# Patient Record
Sex: FEMALE | Race: WHITE | Employment: OTHER | URBAN - METROPOLITAN AREA
[De-identification: names, ages, dates, MRNs, and addresses within clinical notes are randomized per-mention and may not be internally consistent; named-entity substitution may affect disease eponyms.]

---

## 2019-06-12 ENCOUNTER — APPOINTMENT (OUTPATIENT)
Dept: CT IMAGING | Age: 74
DRG: 682 | End: 2019-06-12
Payer: MEDICARE

## 2019-06-12 ENCOUNTER — APPOINTMENT (OUTPATIENT)
Dept: GENERAL RADIOLOGY | Age: 74
DRG: 682 | End: 2019-06-12
Payer: MEDICARE

## 2019-06-12 ENCOUNTER — HOSPITAL ENCOUNTER (INPATIENT)
Age: 74
LOS: 3 days | Discharge: HOME | DRG: 682 | End: 2019-06-15
Attending: EMERGENCY MEDICINE | Admitting: INTERNAL MEDICINE
Payer: MEDICARE

## 2019-06-12 DIAGNOSIS — R42 DIZZINESS: Primary | ICD-10-CM

## 2019-06-12 PROBLEM — N17.9 AKI (ACUTE KIDNEY INJURY) (HCC): Status: ACTIVE | Noted: 2019-06-12

## 2019-06-12 LAB
A/G RATIO: 0.8 (ref 1.1–2.2)
ALBUMIN SERPL-MCNC: 3.3 G/DL (ref 3.4–5)
ALP BLD-CCNC: 52 U/L (ref 40–129)
ALT SERPL-CCNC: 26 U/L (ref 10–40)
ANION GAP SERPL CALCULATED.3IONS-SCNC: 14 MMOL/L (ref 3–16)
AST SERPL-CCNC: 49 U/L (ref 15–37)
BACTERIA: ABNORMAL /HPF
BASOPHILS ABSOLUTE: 0 K/UL (ref 0–0.2)
BASOPHILS RELATIVE PERCENT: 0.2 %
BILIRUB SERPL-MCNC: 1.6 MG/DL (ref 0–1)
BILIRUBIN URINE: NEGATIVE
BLOOD, URINE: ABNORMAL
BUN BLDV-MCNC: 38 MG/DL (ref 7–20)
CALCIUM SERPL-MCNC: 9.9 MG/DL (ref 8.3–10.6)
CHLORIDE BLD-SCNC: 99 MMOL/L (ref 99–110)
CLARITY: ABNORMAL
CO2: 20 MMOL/L (ref 21–32)
COLOR: YELLOW
COMMENT UA: ABNORMAL
CREAT SERPL-MCNC: 1.9 MG/DL (ref 0.6–1.2)
EOSINOPHILS ABSOLUTE: 0 K/UL (ref 0–0.6)
EOSINOPHILS RELATIVE PERCENT: 0.5 %
GFR AFRICAN AMERICAN: 31
GFR NON-AFRICAN AMERICAN: 26
GLOBULIN: 4 G/DL
GLUCOSE BLD-MCNC: 116 MG/DL (ref 70–99)
GLUCOSE URINE: NEGATIVE MG/DL
HCT VFR BLD CALC: 30.4 % (ref 36–48)
HEMOGLOBIN: 10.8 G/DL (ref 12–16)
KETONES, URINE: NEGATIVE MG/DL
LEUKOCYTE ESTERASE, URINE: ABNORMAL
LYMPHOCYTES ABSOLUTE: 0.4 K/UL (ref 1–5.1)
LYMPHOCYTES RELATIVE PERCENT: 5.6 %
MCH RBC QN AUTO: 32.7 PG (ref 26–34)
MCHC RBC AUTO-ENTMCNC: 35.4 G/DL (ref 31–36)
MCV RBC AUTO: 92.3 FL (ref 80–100)
MICROSCOPIC EXAMINATION: YES
MONOCYTES ABSOLUTE: 0.5 K/UL (ref 0–1.3)
MONOCYTES RELATIVE PERCENT: 6.5 %
NEUTROPHILS ABSOLUTE: 6.5 K/UL (ref 1.7–7.7)
NEUTROPHILS RELATIVE PERCENT: 87.2 %
NITRITE, URINE: NEGATIVE
PDW BLD-RTO: 14.8 % (ref 12.4–15.4)
PH UA: 6 (ref 5–8)
PLATELET # BLD: 191 K/UL (ref 135–450)
PMV BLD AUTO: 8 FL (ref 5–10.5)
POTASSIUM SERPL-SCNC: 3.7 MMOL/L (ref 3.5–5.1)
PROTEIN UA: ABNORMAL MG/DL
RBC # BLD: 3.29 M/UL (ref 4–5.2)
RBC UA: ABNORMAL /HPF (ref 0–2)
SODIUM BLD-SCNC: 133 MMOL/L (ref 136–145)
SPECIFIC GRAVITY UA: 1.01 (ref 1–1.03)
TOTAL CK: 192 U/L (ref 26–192)
TOTAL PROTEIN: 7.3 G/DL (ref 6.4–8.2)
TROPONIN: <0.01 NG/ML
URINE REFLEX TO CULTURE: YES
URINE TYPE: ABNORMAL
UROBILINOGEN, URINE: 1 E.U./DL
WBC # BLD: 7.4 K/UL (ref 4–11)
WBC UA: ABNORMAL /HPF (ref 0–5)

## 2019-06-12 PROCEDURE — 72125 CT NECK SPINE W/O DYE: CPT

## 2019-06-12 PROCEDURE — 2580000003 HC RX 258: Performed by: INTERNAL MEDICINE

## 2019-06-12 PROCEDURE — 70450 CT HEAD/BRAIN W/O DYE: CPT

## 2019-06-12 PROCEDURE — 81001 URINALYSIS AUTO W/SCOPE: CPT

## 2019-06-12 PROCEDURE — 87186 SC STD MICRODIL/AGAR DIL: CPT

## 2019-06-12 PROCEDURE — 96360 HYDRATION IV INFUSION INIT: CPT

## 2019-06-12 PROCEDURE — 1200000000 HC SEMI PRIVATE

## 2019-06-12 PROCEDURE — 96361 HYDRATE IV INFUSION ADD-ON: CPT

## 2019-06-12 PROCEDURE — 2580000003 HC RX 258: Performed by: EMERGENCY MEDICINE

## 2019-06-12 PROCEDURE — 71045 X-RAY EXAM CHEST 1 VIEW: CPT

## 2019-06-12 PROCEDURE — 6370000000 HC RX 637 (ALT 250 FOR IP): Performed by: INTERNAL MEDICINE

## 2019-06-12 PROCEDURE — 87077 CULTURE AEROBIC IDENTIFY: CPT

## 2019-06-12 PROCEDURE — 82550 ASSAY OF CK (CPK): CPT

## 2019-06-12 PROCEDURE — 36415 COLL VENOUS BLD VENIPUNCTURE: CPT

## 2019-06-12 PROCEDURE — 99285 EMERGENCY DEPT VISIT HI MDM: CPT

## 2019-06-12 PROCEDURE — 87086 URINE CULTURE/COLONY COUNT: CPT

## 2019-06-12 PROCEDURE — 93005 ELECTROCARDIOGRAM TRACING: CPT | Performed by: EMERGENCY MEDICINE

## 2019-06-12 PROCEDURE — 6360000002 HC RX W HCPCS: Performed by: INTERNAL MEDICINE

## 2019-06-12 PROCEDURE — 84484 ASSAY OF TROPONIN QUANT: CPT

## 2019-06-12 PROCEDURE — 73521 X-RAY EXAM HIPS BI 2 VIEWS: CPT

## 2019-06-12 PROCEDURE — 80053 COMPREHEN METABOLIC PANEL: CPT

## 2019-06-12 PROCEDURE — 85025 COMPLETE CBC W/AUTO DIFF WBC: CPT

## 2019-06-12 RX ORDER — ACETAMINOPHEN 325 MG/1
325 TABLET ORAL ONCE
Status: DISCONTINUED | OUTPATIENT
Start: 2019-06-12 | End: 2019-06-12

## 2019-06-12 RX ORDER — ACETAMINOPHEN 325 MG/1
650 TABLET ORAL EVERY 4 HOURS PRN
Status: DISCONTINUED | OUTPATIENT
Start: 2019-06-12 | End: 2019-06-15 | Stop reason: HOSPADM

## 2019-06-12 RX ORDER — ACETAMINOPHEN 325 MG/1
650 TABLET ORAL ONCE
Status: COMPLETED | OUTPATIENT
Start: 2019-06-12 | End: 2019-06-12

## 2019-06-12 RX ORDER — ACETAMINOPHEN, ASPIRIN AND CAFFEINE 250; 250; 65 MG/1; MG/1; MG/1
1 TABLET, FILM COATED ORAL EVERY 6 HOURS PRN
Status: DISCONTINUED | OUTPATIENT
Start: 2019-06-12 | End: 2019-06-15 | Stop reason: HOSPADM

## 2019-06-12 RX ORDER — SODIUM CHLORIDE 9 MG/ML
INJECTION, SOLUTION INTRAVENOUS CONTINUOUS
Status: DISCONTINUED | OUTPATIENT
Start: 2019-06-12 | End: 2019-06-14

## 2019-06-12 RX ORDER — KETOROLAC TROMETHAMINE 30 MG/ML
15 INJECTION, SOLUTION INTRAMUSCULAR; INTRAVENOUS EVERY 6 HOURS PRN
Status: CANCELLED | OUTPATIENT
Start: 2019-06-12 | End: 2019-06-17

## 2019-06-12 RX ORDER — SODIUM CHLORIDE 0.9 % (FLUSH) 0.9 %
10 SYRINGE (ML) INJECTION EVERY 12 HOURS SCHEDULED
Status: DISCONTINUED | OUTPATIENT
Start: 2019-06-12 | End: 2019-06-15 | Stop reason: HOSPADM

## 2019-06-12 RX ORDER — VITAMIN B COMPLEX
1 CAPSULE ORAL DAILY
COMMUNITY

## 2019-06-12 RX ORDER — ASCORBIC ACID 500 MG
500 TABLET ORAL DAILY
COMMUNITY

## 2019-06-12 RX ORDER — 0.9 % SODIUM CHLORIDE 0.9 %
500 INTRAVENOUS SOLUTION INTRAVENOUS ONCE
Status: COMPLETED | OUTPATIENT
Start: 2019-06-12 | End: 2019-06-12

## 2019-06-12 RX ORDER — M-VIT,TX,IRON,MINS/CALC/FOLIC 27MG-0.4MG
1 TABLET ORAL DAILY
COMMUNITY

## 2019-06-12 RX ORDER — ONDANSETRON 2 MG/ML
4 INJECTION INTRAMUSCULAR; INTRAVENOUS EVERY 6 HOURS PRN
Status: DISCONTINUED | OUTPATIENT
Start: 2019-06-12 | End: 2019-06-15 | Stop reason: HOSPADM

## 2019-06-12 RX ORDER — SODIUM CHLORIDE 0.9 % (FLUSH) 0.9 %
10 SYRINGE (ML) INJECTION PRN
Status: DISCONTINUED | OUTPATIENT
Start: 2019-06-12 | End: 2019-06-15 | Stop reason: HOSPADM

## 2019-06-12 RX ORDER — ACETAMINOPHEN, ASPIRIN AND CAFFEINE 250; 250; 65 MG/1; MG/1; MG/1
1 TABLET, FILM COATED ORAL EVERY 6 HOURS PRN
COMMUNITY
End: 2022-09-06

## 2019-06-12 RX ADMIN — ACETAMINOPHEN 650 MG: 325 TABLET ORAL at 18:21

## 2019-06-12 RX ADMIN — SODIUM CHLORIDE: 9 INJECTION, SOLUTION INTRAVENOUS at 21:45

## 2019-06-12 RX ADMIN — Medication 10 ML: at 21:45

## 2019-06-12 RX ADMIN — ENOXAPARIN SODIUM 30 MG: 30 INJECTION SUBCUTANEOUS at 21:45

## 2019-06-12 RX ADMIN — SODIUM CHLORIDE 500 ML: 9 INJECTION, SOLUTION INTRAVENOUS at 16:53

## 2019-06-12 SDOH — HEALTH STABILITY: MENTAL HEALTH: HOW OFTEN DO YOU HAVE A DRINK CONTAINING ALCOHOL?: NEVER

## 2019-06-12 ASSESSMENT — PAIN SCALES - GENERAL
PAINLEVEL_OUTOF10: 6
PAINLEVEL_OUTOF10: 6

## 2019-06-12 ASSESSMENT — PAIN DESCRIPTION - DESCRIPTORS: DESCRIPTORS: ACHING

## 2019-06-12 ASSESSMENT — PAIN DESCRIPTION - PAIN TYPE: TYPE: ACUTE PAIN

## 2019-06-12 ASSESSMENT — PAIN DESCRIPTION - LOCATION: LOCATION: GENERALIZED

## 2019-06-12 NOTE — H&P
Yes Historical Provider, MD       Allergies:  Barium iodide and Penicillins    Social History:  The patient currently lives at home and cares for herself    TOBACCO:   reports that she has never smoked. She has never used smokeless tobacco.  ETOH:   reports that she does not drink alcohol. Family History:  Reviewed in detail and negative for DM, Early CAD, Cancer, CVA. Positive as follows:    History reviewed. No pertinent family history. REVIEW OF SYSTEMS:       See above    PHYSICAL EXAM:    BP (!) 143/66   Pulse 87   Temp 97.2 °F (36.2 °C) (Oral)   Resp 16   SpO2 100%     Gen: NAD  HEENT: NC/AT, moist mucous membranes, no oropharyngeal erythema or exudate  Neck: supple, trachea midline, no anterior cervical or SC LAD  Heart:  Normal s1/s2, RRR, no murmurs, gallops, or rubs. no leg edema  Lungs:  CTA bilaterally, no wheeze,no rales or rhonchi, no use of accessory muscles  Abd: bowel sounds present, soft, nontender, nondistended, no masses  Extrem:  No clubbing, cyanosis,  no edema  Skin: no lesion or masses  Psych:  A & O x3  Neuro: grossly intact, moves all four extremities      CXR:  I have reviewed the CXR with the following interpretation: Reviewed, no acute process  EKG:  I have reviewed the EKG with the following interpretation: Reviewed, no dynamic changes    CBC   Recent Labs     06/12/19  1547   WBC 7.4   HGB 10.8*   HCT 30.4*         RENAL  Recent Labs     06/12/19  1547   *   K 3.7   CL 99   CO2 20*   BUN 38*   CREATININE 1.9*     LFT'S  Recent Labs     06/12/19  1547   AST 49*   ALT 26   BILITOT 1.6*   ALKPHOS 52     COAG  No results for input(s): INR in the last 72 hours.   CARDIAC ENZYMES  Recent Labs     06/12/19  1547   CKTOTAL 192   TROPONINI <0.01       U/A:    Lab Results   Component Value Date    COLORU YELLOW 06/12/2019    WBCUA 3-5 06/12/2019    RBCUA 0-2 06/12/2019    BACTERIA Rare 06/12/2019    CLARITYU CLOUDY 06/12/2019    SPECGRAV 1.013 06/12/2019    LEUKOCYTESUR

## 2019-06-13 ENCOUNTER — APPOINTMENT (OUTPATIENT)
Dept: MRI IMAGING | Age: 74
DRG: 682 | End: 2019-06-13
Payer: MEDICARE

## 2019-06-13 LAB
ANION GAP SERPL CALCULATED.3IONS-SCNC: 11 MMOL/L (ref 3–16)
BUN BLDV-MCNC: 27 MG/DL (ref 7–20)
CALCIUM SERPL-MCNC: 9 MG/DL (ref 8.3–10.6)
CHLORIDE BLD-SCNC: 104 MMOL/L (ref 99–110)
CO2: 20 MMOL/L (ref 21–32)
CREAT SERPL-MCNC: 1.8 MG/DL (ref 0.6–1.2)
CREATININE URINE: 136.2 MG/DL (ref 28–259)
EKG ATRIAL RATE: 85 BPM
EKG DIAGNOSIS: NORMAL
EKG P AXIS: 55 DEGREES
EKG P-R INTERVAL: 148 MS
EKG Q-T INTERVAL: 406 MS
EKG QRS DURATION: 118 MS
EKG QTC CALCULATION (BAZETT): 483 MS
EKG R AXIS: -20 DEGREES
EKG T AXIS: 23 DEGREES
EKG VENTRICULAR RATE: 85 BPM
ESTIMATED AVERAGE GLUCOSE: 108.3 MG/DL
GFR AFRICAN AMERICAN: 33
GFR NON-AFRICAN AMERICAN: 28
GLUCOSE BLD-MCNC: 108 MG/DL (ref 70–99)
HBA1C MFR BLD: 5.4 %
HCT VFR BLD CALC: 27.8 % (ref 36–48)
HEMOGLOBIN: 9.7 G/DL (ref 12–16)
MCH RBC QN AUTO: 32 PG (ref 26–34)
MCHC RBC AUTO-ENTMCNC: 34.9 G/DL (ref 31–36)
MCV RBC AUTO: 91.6 FL (ref 80–100)
PDW BLD-RTO: 14.9 % (ref 12.4–15.4)
PLATELET # BLD: 217 K/UL (ref 135–450)
PMV BLD AUTO: 7.9 FL (ref 5–10.5)
POTASSIUM REFLEX MAGNESIUM: 3.7 MMOL/L (ref 3.5–5.1)
RBC # BLD: 3.03 M/UL (ref 4–5.2)
SODIUM BLD-SCNC: 135 MMOL/L (ref 136–145)
SODIUM URINE: 47 MMOL/L
T4 FREE: 0.4 NG/DL (ref 0.9–1.8)
TSH REFLEX: 27.06 UIU/ML (ref 0.27–4.2)
URIC ACID, UR: 65.5 MG/DL (ref 37–92)
WBC # BLD: 6.7 K/UL (ref 4–11)

## 2019-06-13 PROCEDURE — 97167 OT EVAL HIGH COMPLEX 60 MIN: CPT

## 2019-06-13 PROCEDURE — 36415 COLL VENOUS BLD VENIPUNCTURE: CPT

## 2019-06-13 PROCEDURE — 2580000003 HC RX 258: Performed by: INTERNAL MEDICINE

## 2019-06-13 PROCEDURE — 6370000000 HC RX 637 (ALT 250 FOR IP): Performed by: INTERNAL MEDICINE

## 2019-06-13 PROCEDURE — 1200000000 HC SEMI PRIVATE

## 2019-06-13 PROCEDURE — 84439 ASSAY OF FREE THYROXINE: CPT

## 2019-06-13 PROCEDURE — 70551 MRI BRAIN STEM W/O DYE: CPT

## 2019-06-13 PROCEDURE — 94760 N-INVAS EAR/PLS OXIMETRY 1: CPT

## 2019-06-13 PROCEDURE — 97530 THERAPEUTIC ACTIVITIES: CPT

## 2019-06-13 PROCEDURE — 6360000002 HC RX W HCPCS: Performed by: INTERNAL MEDICINE

## 2019-06-13 PROCEDURE — 85027 COMPLETE CBC AUTOMATED: CPT

## 2019-06-13 PROCEDURE — 83036 HEMOGLOBIN GLYCOSYLATED A1C: CPT

## 2019-06-13 PROCEDURE — 82570 ASSAY OF URINE CREATININE: CPT

## 2019-06-13 PROCEDURE — 84300 ASSAY OF URINE SODIUM: CPT

## 2019-06-13 PROCEDURE — 97116 GAIT TRAINING THERAPY: CPT

## 2019-06-13 PROCEDURE — 84443 ASSAY THYROID STIM HORMONE: CPT

## 2019-06-13 PROCEDURE — 80048 BASIC METABOLIC PNL TOTAL CA: CPT

## 2019-06-13 PROCEDURE — 84560 ASSAY OF URINE/URIC ACID: CPT

## 2019-06-13 PROCEDURE — 93010 ELECTROCARDIOGRAM REPORT: CPT | Performed by: INTERNAL MEDICINE

## 2019-06-13 PROCEDURE — 97535 SELF CARE MNGMENT TRAINING: CPT

## 2019-06-13 PROCEDURE — 97161 PT EVAL LOW COMPLEX 20 MIN: CPT

## 2019-06-13 RX ORDER — LEVOTHYROXINE SODIUM 0.05 MG/1
50 TABLET ORAL DAILY
Status: DISCONTINUED | OUTPATIENT
Start: 2019-06-13 | End: 2019-06-15 | Stop reason: HOSPADM

## 2019-06-13 RX ORDER — CIPROFLOXACIN 2 MG/ML
200 INJECTION, SOLUTION INTRAVENOUS EVERY 12 HOURS
Status: DISCONTINUED | OUTPATIENT
Start: 2019-06-13 | End: 2019-06-14

## 2019-06-13 RX ADMIN — SODIUM CHLORIDE: 9 INJECTION, SOLUTION INTRAVENOUS at 20:18

## 2019-06-13 RX ADMIN — CIPROFLOXACIN 200 MG: 2 INJECTION, SOLUTION INTRAVENOUS at 22:49

## 2019-06-13 RX ADMIN — ACETAMINOPHEN 650 MG: 325 TABLET ORAL at 20:18

## 2019-06-13 RX ADMIN — ACETAMINOPHEN, ASPIRIN AND CAFFEINE 1 TABLET: 250; 250; 65 TABLET, FILM COATED ORAL at 06:23

## 2019-06-13 RX ADMIN — ENOXAPARIN SODIUM 30 MG: 30 INJECTION SUBCUTANEOUS at 20:18

## 2019-06-13 RX ADMIN — CIPROFLOXACIN 200 MG: 2 INJECTION, SOLUTION INTRAVENOUS at 11:55

## 2019-06-13 RX ADMIN — LEVOTHYROXINE SODIUM 50 MCG: 50 TABLET ORAL at 16:20

## 2019-06-13 ASSESSMENT — PAIN DESCRIPTION - ONSET
ONSET: GRADUAL
ONSET: GRADUAL

## 2019-06-13 ASSESSMENT — PAIN DESCRIPTION - ORIENTATION
ORIENTATION: MID
ORIENTATION: MID

## 2019-06-13 ASSESSMENT — PAIN SCALES - GENERAL
PAINLEVEL_OUTOF10: 0
PAINLEVEL_OUTOF10: 3
PAINLEVEL_OUTOF10: 0
PAINLEVEL_OUTOF10: 0
PAINLEVEL_OUTOF10: 3

## 2019-06-13 ASSESSMENT — PAIN DESCRIPTION - FREQUENCY
FREQUENCY: INTERMITTENT
FREQUENCY: INTERMITTENT

## 2019-06-13 ASSESSMENT — PAIN DESCRIPTION - PAIN TYPE
TYPE: ACUTE PAIN
TYPE: ACUTE PAIN

## 2019-06-13 ASSESSMENT — PAIN DESCRIPTION - PROGRESSION: CLINICAL_PROGRESSION: GRADUALLY IMPROVING

## 2019-06-13 ASSESSMENT — PAIN DESCRIPTION - LOCATION
LOCATION: HEAD
LOCATION: HEAD

## 2019-06-13 ASSESSMENT — PAIN DESCRIPTION - DESCRIPTORS
DESCRIPTORS: HEADACHE
DESCRIPTORS: HEADACHE

## 2019-06-13 ASSESSMENT — PAIN - FUNCTIONAL ASSESSMENT: PAIN_FUNCTIONAL_ASSESSMENT: ACTIVITIES ARE NOT PREVENTED

## 2019-06-13 NOTE — PROGRESS NOTES
no device. Pt completed toileting with SBA. Pt with decreased cognition - safety awareness and complex thinking at this time. Pt is unsafe to return home alone at this time - recommend 24/7 supervision/assist for safety awareness and homemaking responsibilities. Treatment Diagnosis: impaired func mob/balance, ADL status, and safety awareness/cognition  Prognosis: Fair;Good  Decision Making: High Complexity  History: PMH: Vertigo  Exam: ADLs, transfers, bed mob, func mob  Assistance / Modification: SBA/CGA for mobility, SBA for ADLs  Patient Education: Role of OT, POC, safety, continued therapy   REQUIRES OT FOLLOW UP: Yes  Activity Tolerance  Activity Tolerance: Patient Tolerated treatment well  Safety Devices  Safety Devices in place: Yes(RN Ga Byrd) notified)  Type of devices: Nurse notified;Call light within reach; Chair alarm in place; Left in chair;Gait belt           Patient Diagnosis(es): The encounter diagnosis was Dizziness. has a past medical history of Vertigo. has no past surgical history on file. Treatment Diagnosis: impaired func mob/balance, ADL status, and safety awareness/cognition      Restrictions  Restrictions/Precautions  Restrictions/Precautions: Fall Risk    Subjective   General  Chart Reviewed: Yes  Patient assessed for rehabilitation services?: Yes  Additional Pertinent Hx: Per Anaid Eddy MD 6/12/19: Pt is \"69year-old female with insignificant medical history who presents after prolonged downtime. Patient states that on Saturday she ended up becoming wedged between her bed and a piece of furniture. She states that her legs simply  \"gave out\". She attributes this to the \"contaminated\" meat she ate from [employer] given the recent recall. Patient denies specific prodrome before becoming stuck; she denies chest pain, abdominal pain, nausea, vomiting, diarrhea. Patient states that she could not get up for approximately 2 days. During this time she was not eating.   Patient was

## 2019-06-13 NOTE — PROGRESS NOTES
Physical Therapy    Facility/Department: [address] ORTHOPEDICS  Initial Assessment  This note serves as patient discharge summary if pt discharges prior to next PT visit      NAME: Rochelle Pimentel  : 1945  MRN: [id number]    Date of Service: 2019    Discharge Recommendations:  Continue to assess pending progress, Patient would benefit from continued therapy after discharge   Rochelle Pimentel scored a 19/24 on the AM-PAC short mobility form. Current research shows that an AM-PAC score of 17 or less is typically not associated with a discharge to the patient's home setting. Based on the patients AM-PAC score and their current functional mobility deficits, it is recommended that the patient have 5-7 sessions per week of Physical Therapy at d/c to increase the patients independence. Assessment: Prior to admit to hospital following a fall, patient reportedly lived alone and was independent with functional mobility, including yard work, ADLs, gait withour device, and driving. Per discussion with POA, patient home is poorly kept, with animals urinating in the home. Patient lacks safety awareness, and lacks insight to her judgement lapses. Physically/functionally, she functions at a SBA-CGA level, including gait without device x 60'. Obviously there is a high concern regarding patient safety and insight. Living alone is likely a poor plan at this time. She would benefit from some therapy to maximze functional strength and balance. Will continue to see while in the hospital.  Will continue to see while in the hospital.   PT Equipment Recommendations  Other: continue to assess. Assessment   Body structures, Functions, Activity limitations: Decreased safe awareness  Treatment Diagnosis: Impaired functional mobility  Prognosis: Good  Decision Making: Low Complexity  History: see top of note  Clinical Presentation: Stable   Patient Education: PT plan of care.  Rationale and recommendation for Life Alert No  Balance  Sitting - Static: Good  Sitting - Dynamic: Good  Standing - Static: Fair;+  Standing - Dynamic: Fair;+    Plan   Plan  Times per week: 3-5  Current Treatment Recommendations: Functional Mobility Training, Transfer Training, Balance Training, Gait Training, Safety Education & Training, Patient/Caregiver Education & Training, Equipment Evaluation, Education, & procurement  Safety Devices  Type of devices: Call light within reach, Chair alarm in place, Gait belt, Left in chair    AM-PAC Score  AM-PAC Inpatient Mobility Raw Score : 19 (06/13/19 1547)  AM-PAC Inpatient T-Scale Score : 45.44 (06/13/19 1547)  Mobility Inpatient CMS 0-100% Score: 41.77 (06/13/19 1547)  Mobility Inpatient CMS G-Code Modifier : CK (06/13/19 1547)     Goals  Short term goals  Time Frame for Short term goals: By acute DC  Short term goal 1: Bed Mobility Mod Indep. Short term goal 2: Transfers Supervision. Short term goal 3: Gait without device 100' Supervision.   Patient Goals   Patient goals : none stated    Therapy Time   Individual Concurrent Group Co-treatment   Time In 0301         Time Out 1600         Minutes 72            Rayne Libman, PT  Electronically signed by Guillermo Zamudio, JOSE [id number] on 6/13/2019 at 4:11 PM

## 2019-06-13 NOTE — CONSULTS
Received nephrology consult for Julian Mantilla, 1945, [id number]      Will will see the patient in the morning. Case discussed with nurse. However, we will be available for questions in the meantime. Please call [phone].

## 2019-06-13 NOTE — PROGRESS NOTES
MRI checklist completed by pt and POA. Will continue to monitor and reassess.   Electronically signed by Byron Saab RN on 6/13/2019 at 11:28 AM

## 2019-06-13 NOTE — PROGRESS NOTES
Department of Internal Medicine  General Internal Medicine   Progress Note    Chief Complaint:  Fall    SUBJECTIVE:  Pt is feeling better. POA is asking for brain MRI as pt has been \"acting off\". Pt denies any focal deficits. Her legs give out when she walks. OBJECTIVE      Medications    Current Facility-Administered Medications: ciprofloxacin (CIPRO) IVPB 200 mg, 200 mg, Intravenous, Q12H  aspirin-acetaminophen-caffeine (EXCEDRIN MIGRAINE) per tablet 1 tablet, 1 tablet, Oral, Q6H PRN  sodium chloride flush 0.9 % injection 10 mL, 10 mL, Intravenous, 2 times per day  sodium chloride flush 0.9 % injection 10 mL, 10 mL, Intravenous, PRN  ondansetron (ZOFRAN) injection 4 mg, 4 mg, Intravenous, Q6H PRN  acetaminophen (TYLENOL) tablet 650 mg, 650 mg, Oral, Q4H PRN  0.9 % sodium chloride infusion, , Intravenous, Continuous  enoxaparin (LOVENOX) injection 30 mg, 30 mg, Subcutaneous, Nightly  Physical    VITALS:  /61   Pulse 76   Temp 98.5 °F (36.9 °C) (Oral)   Resp 14   Ht 5' 2\" (1.575 m)   Wt 157 lb 6.5 oz (71.4 kg)   SpO2 98%   BMI 28.79 kg/m²     Gen: NAD  HEENT: NC/AT, moist mucous membranes, no oropharyngeal erythema or exudate  Neck: supple, trachea midline, no anterior cervical or SC LAD  Heart:  Normal s1/s2, RRR, no murmurs, gallops, or rubs. no leg edema  Lungs:  CTA bilaterally, no wheeze,no rales or rhonchi, no use of accessory muscles  Abd: bowel sounds present, soft, nontender, nondistended, no masses  Extrem:  No clubbing, cyanosis,  no edema  Skin: no lesion or masses  Psych:  A & O x3  Neuro: grossly intact, moves all four extremities       ASSESSMENT AND PLAN      Fall   Suspect mechanical in nature, patient states her legs gave out. With prolonged down time, proximally 48 hours. CPK within normal limits. X ray bilateral hips, CT head and C-spine without fractures. Pain control with Tylenol.   PT/OT eval ordered    Acute kidney injury. Unclear baseline.  Suspect pre-renal given BUN/creatinine ratio. Doubt obstruction. Continue with IV fluids. Will obtain postvoid residual.  Nephrology consult     Acute encephalopathy   Likely 2/2 UTI vs. undiagnosed hypothyroidism with TSH of 27.06    Cont iv cipro bid  F/u urine cx results  Brain MRI pending     Hypothyroidism  New diagnosis  Start synthroid  Repeat TFTs in 3-4 weeks  F/u with pcp      Hyponatremia. Improving. Suspect hypotonic hypovolemic.    Cont NS IV fluids      DVT Prophylaxis: Lovenox  Code Status: Full code   PT/OT Eval Status: ordered      Dispo - home in 1-2 days pending resolution of ZOE

## 2019-06-14 ENCOUNTER — APPOINTMENT (OUTPATIENT)
Dept: ULTRASOUND IMAGING | Age: 74
DRG: 682 | End: 2019-06-14
Payer: MEDICARE

## 2019-06-14 LAB
ALBUMIN SERPL-MCNC: 2.6 G/DL (ref 3.4–5)
ALP BLD-CCNC: 47 U/L (ref 40–129)
ALT SERPL-CCNC: 21 U/L (ref 10–40)
ANION GAP SERPL CALCULATED.3IONS-SCNC: 12 MMOL/L (ref 3–16)
AST SERPL-CCNC: 32 U/L (ref 15–37)
BILIRUB SERPL-MCNC: 1 MG/DL (ref 0–1)
BILIRUBIN DIRECT: 0.4 MG/DL (ref 0–0.3)
BILIRUBIN, INDIRECT: 0.6 MG/DL (ref 0–1)
BUN BLDV-MCNC: 18 MG/DL (ref 7–20)
CALCIUM SERPL-MCNC: 8.9 MG/DL (ref 8.3–10.6)
CHLORIDE BLD-SCNC: 106 MMOL/L (ref 99–110)
CO2: 19 MMOL/L (ref 21–32)
CREAT SERPL-MCNC: 1.5 MG/DL (ref 0.6–1.2)
GFR AFRICAN AMERICAN: 41
GFR NON-AFRICAN AMERICAN: 34
GLUCOSE BLD-MCNC: 117 MG/DL (ref 70–99)
POTASSIUM SERPL-SCNC: 3.6 MMOL/L (ref 3.5–5.1)
SODIUM BLD-SCNC: 137 MMOL/L (ref 136–145)
TOTAL PROTEIN: 6 G/DL (ref 6.4–8.2)

## 2019-06-14 PROCEDURE — 2580000003 HC RX 258: Performed by: INTERNAL MEDICINE

## 2019-06-14 PROCEDURE — 6370000000 HC RX 637 (ALT 250 FOR IP): Performed by: INTERNAL MEDICINE

## 2019-06-14 PROCEDURE — 97530 THERAPEUTIC ACTIVITIES: CPT

## 2019-06-14 PROCEDURE — 6360000002 HC RX W HCPCS: Performed by: INTERNAL MEDICINE

## 2019-06-14 PROCEDURE — 1200000000 HC SEMI PRIVATE

## 2019-06-14 PROCEDURE — 76770 US EXAM ABDO BACK WALL COMP: CPT

## 2019-06-14 PROCEDURE — 94760 N-INVAS EAR/PLS OXIMETRY 1: CPT

## 2019-06-14 PROCEDURE — 97116 GAIT TRAINING THERAPY: CPT

## 2019-06-14 PROCEDURE — 36415 COLL VENOUS BLD VENIPUNCTURE: CPT

## 2019-06-14 PROCEDURE — 80048 BASIC METABOLIC PNL TOTAL CA: CPT

## 2019-06-14 PROCEDURE — 80076 HEPATIC FUNCTION PANEL: CPT

## 2019-06-14 RX ORDER — CIPROFLOXACIN 500 MG/1
500 TABLET, FILM COATED ORAL EVERY 12 HOURS SCHEDULED
Status: DISCONTINUED | OUTPATIENT
Start: 2019-06-14 | End: 2019-06-15 | Stop reason: HOSPADM

## 2019-06-14 RX ADMIN — ACETAMINOPHEN 650 MG: 325 TABLET ORAL at 09:57

## 2019-06-14 RX ADMIN — SODIUM CHLORIDE: 9 INJECTION, SOLUTION INTRAVENOUS at 11:01

## 2019-06-14 RX ADMIN — CIPROFLOXACIN 200 MG: 2 INJECTION, SOLUTION INTRAVENOUS at 11:01

## 2019-06-14 RX ADMIN — ENOXAPARIN SODIUM 30 MG: 30 INJECTION SUBCUTANEOUS at 20:05

## 2019-06-14 RX ADMIN — LEVOTHYROXINE SODIUM 50 MCG: 50 TABLET ORAL at 05:03

## 2019-06-14 RX ADMIN — CIPROFLOXACIN HYDROCHLORIDE 500 MG: 500 TABLET, FILM COATED ORAL at 20:05

## 2019-06-14 ASSESSMENT — PAIN SCALES - GENERAL
PAINLEVEL_OUTOF10: 4
PAINLEVEL_OUTOF10: 0

## 2019-06-14 ASSESSMENT — PAIN DESCRIPTION - PROGRESSION
CLINICAL_PROGRESSION: RESOLVED
CLINICAL_PROGRESSION: GRADUALLY WORSENING

## 2019-06-14 ASSESSMENT — PAIN DESCRIPTION - LOCATION
LOCATION: HEAD
LOCATION: HEAD

## 2019-06-14 ASSESSMENT — PAIN DESCRIPTION - ORIENTATION
ORIENTATION: MID
ORIENTATION: MID

## 2019-06-14 ASSESSMENT — PAIN DESCRIPTION - DESCRIPTORS
DESCRIPTORS: HEADACHE
DESCRIPTORS: HEADACHE

## 2019-06-14 ASSESSMENT — PAIN DESCRIPTION - FREQUENCY
FREQUENCY: INTERMITTENT
FREQUENCY: INTERMITTENT

## 2019-06-14 ASSESSMENT — PAIN DESCRIPTION - ONSET
ONSET: GRADUAL
ONSET: GRADUAL

## 2019-06-14 ASSESSMENT — PAIN DESCRIPTION - PAIN TYPE
TYPE: ACUTE PAIN
TYPE: ACUTE PAIN

## 2019-06-14 ASSESSMENT — PAIN - FUNCTIONAL ASSESSMENT
PAIN_FUNCTIONAL_ASSESSMENT: ACTIVITIES ARE NOT PREVENTED
PAIN_FUNCTIONAL_ASSESSMENT: ACTIVITIES ARE NOT PREVENTED

## 2019-06-14 NOTE — PROGRESS NOTES
Physical Therapy  6/14/2019  [facility]  [id number]  [id number]    Therapist to room to respond to bed alarm. Patient in room ambulating around bed. Patient states, Noni Mackay give me a shot of blood thinner to prevent blood clots, then I sit here. I need to walk. \"    Patient oriented to person and place, but not understanding her fall risk, given she and fallen and was down for a number of days. Patient states, \"I'm not here because fell, I am here to check and see if I had E Coli from bad meat I ate. I feel better now. \"    Patient ambulates without device, light touch intermittently to rail, with SBA, 125'. Transfers to sitting with Supervision. Declines need for restroom use. Therapist again explains concern of patient being home alone. Patient reports having a large group of friends, but adamantly states they \"can't stay with me, and that I don't want them to. \"    Patient left in BS chair with alarm on and needs in reach. RN Alfonso Castellanos informed. Patient's bed did not alert nurses' phones; this therapist informs maintenance. [facility] [place] scored a 19/24 on the AM-PAC short mobility form. Assessment: Prior to admit to hospital following a fall, patient reportedly lived alone and was independent with functional mobility, including yard work, ADLs, gait without device, and driving. Per discussion with POA, patient home is poorly kept, with animals urinating in the home. Patient lacks safety awareness, and lacks insight to her judgement lapses. Physically/functionally, she functions at a SBA-Supervision level, including gait without device x 125'. Obviously there is a high concern regarding patient safety and insight. Living alone is likely a poor plan at this time. She would benefit from some therapy to maximze functional strength and balance.  Will continue to see while in the hospital.      Electronically signed by Denise Woo, PT [id number] on 6/14/2019 at 5:55 PM

## 2019-06-14 NOTE — CARE COORDINATION
Sw received call today from patient's ARMINDA/Maura. She reported patient lives alone and came into the hospital after a fall. She is concerned that patient will return home and refuse any interventions and may fall again. Therapy is recommending rehab prior to return home. Sw spoke with patient regarding therapy recommendations. Patient reported that she does not want to go to rehab or anywhere but home. She reported being independent in adls. Sw spoke with patient about home care as an option and a lifeline. She refused home care and reported that she carries her phone with her. Patient reported that she knows that she can make her own decisions and she refuses snf and home care and life line. She plans to return home to her same routine and does not want any interventions at this time.      Electronically signed by Saima Joshi on 6/14/2019 at 4:01 PM

## 2019-06-15 VITALS
WEIGHT: 161.3 LBS | HEIGHT: 62 IN | RESPIRATION RATE: 16 BRPM | SYSTOLIC BLOOD PRESSURE: 146 MMHG | OXYGEN SATURATION: 90 % | HEART RATE: 92 BPM | DIASTOLIC BLOOD PRESSURE: 69 MMHG | TEMPERATURE: 97.7 F | BODY MASS INDEX: 29.68 KG/M2

## 2019-06-15 PROBLEM — E03.9 HYPOTHYROIDISM (ACQUIRED): Status: ACTIVE | Noted: 2019-06-15

## 2019-06-15 PROBLEM — N39.0 ACUTE UTI (URINARY TRACT INFECTION): Status: ACTIVE | Noted: 2019-06-15

## 2019-06-15 LAB
ANION GAP SERPL CALCULATED.3IONS-SCNC: 9 MMOL/L (ref 3–16)
BUN BLDV-MCNC: 15 MG/DL (ref 7–20)
CALCIUM SERPL-MCNC: 9 MG/DL (ref 8.3–10.6)
CHLORIDE BLD-SCNC: 106 MMOL/L (ref 99–110)
CO2: 20 MMOL/L (ref 21–32)
CREAT SERPL-MCNC: 1.5 MG/DL (ref 0.6–1.2)
FERRITIN: 131 NG/ML (ref 15–150)
GFR AFRICAN AMERICAN: 41
GFR NON-AFRICAN AMERICAN: 34
GLUCOSE BLD-MCNC: 98 MG/DL (ref 70–99)
HCT VFR BLD CALC: 27.5 % (ref 36–48)
HEMOGLOBIN: 9.6 G/DL (ref 12–16)
IRON SATURATION: 23 % (ref 15–50)
IRON: 45 UG/DL (ref 37–145)
MCH RBC QN AUTO: 32.3 PG (ref 26–34)
MCHC RBC AUTO-ENTMCNC: 34.8 G/DL (ref 31–36)
MCV RBC AUTO: 92.7 FL (ref 80–100)
ORGANISM: ABNORMAL
PDW BLD-RTO: 14.9 % (ref 12.4–15.4)
PLATELET # BLD: 275 K/UL (ref 135–450)
PMV BLD AUTO: 7 FL (ref 5–10.5)
POTASSIUM SERPL-SCNC: 3.7 MMOL/L (ref 3.5–5.1)
RBC # BLD: 2.96 M/UL (ref 4–5.2)
SODIUM BLD-SCNC: 135 MMOL/L (ref 136–145)
TOTAL IRON BINDING CAPACITY: 194 UG/DL (ref 260–445)
URINE CULTURE, ROUTINE: ABNORMAL
URINE CULTURE, ROUTINE: ABNORMAL
WBC # BLD: 4.3 K/UL (ref 4–11)

## 2019-06-15 PROCEDURE — 94760 N-INVAS EAR/PLS OXIMETRY 1: CPT

## 2019-06-15 PROCEDURE — 83550 IRON BINDING TEST: CPT

## 2019-06-15 PROCEDURE — 83540 ASSAY OF IRON: CPT

## 2019-06-15 PROCEDURE — 6370000000 HC RX 637 (ALT 250 FOR IP): Performed by: INTERNAL MEDICINE

## 2019-06-15 PROCEDURE — 85027 COMPLETE CBC AUTOMATED: CPT

## 2019-06-15 PROCEDURE — 80048 BASIC METABOLIC PNL TOTAL CA: CPT

## 2019-06-15 PROCEDURE — 82728 ASSAY OF FERRITIN: CPT

## 2019-06-15 PROCEDURE — 36415 COLL VENOUS BLD VENIPUNCTURE: CPT

## 2019-06-15 RX ORDER — LEVOTHYROXINE SODIUM 0.05 MG/1
50 TABLET ORAL DAILY
Qty: 30 TABLET | Refills: 11 | Status: SHIPPED | OUTPATIENT
Start: 2019-06-16 | End: 2020-04-29 | Stop reason: SDUPTHER

## 2019-06-15 RX ORDER — CIPROFLOXACIN 500 MG/1
500 TABLET, FILM COATED ORAL EVERY 12 HOURS SCHEDULED
Qty: 6 TABLET | Refills: 0 | Status: SHIPPED | OUTPATIENT
Start: 2019-06-15 | End: 2019-06-18

## 2019-06-15 RX ORDER — CIPROFLOXACIN 500 MG/1
500 TABLET, FILM COATED ORAL EVERY 12 HOURS SCHEDULED
Qty: 6 TABLET | Refills: 0 | Status: SHIPPED | OUTPATIENT
Start: 2019-06-15 | End: 2019-06-15

## 2019-06-15 RX ORDER — LEVOTHYROXINE SODIUM 0.05 MG/1
50 TABLET ORAL DAILY
Qty: 30 TABLET | Refills: 11 | Status: SHIPPED | OUTPATIENT
Start: 2019-06-16 | End: 2019-06-15

## 2019-06-15 RX ADMIN — LEVOTHYROXINE SODIUM 50 MCG: 50 TABLET ORAL at 05:25

## 2019-06-15 RX ADMIN — CIPROFLOXACIN HYDROCHLORIDE 500 MG: 500 TABLET, FILM COATED ORAL at 08:35

## 2019-06-15 ASSESSMENT — PAIN SCALES - GENERAL: PAINLEVEL_OUTOF10: 0

## 2019-06-15 NOTE — CARE COORDINATION
LATESHA spoke with pt on the phone who reports she is agreeable to home care. Pt is interested in RN from [facility] care. LATESHA called and left message to make referral for RN. POA will transport pt home at [facility]  Electronically signed by [id number] JOANIE Childress on 6/15/2019 at 11:25 AM  LATESHA recd call from [facility]. Asked to fax referrals.   LATESHA faxed referral.  They will contact her on Monday  Electronically signed by [id number] JOANIE Childress on 6/15/2019 at 11:47 AM

## 2019-06-15 NOTE — DISCHARGE SUMMARY
acute focal process. There is no effusion or pneumothorax. The cardiomediastinal silhouette is without acute process. The osseous structures are without acute process. No acute process. Mri Brain Wo Contrast    Result Date: 6/13/2019  EXAMINATION: MRI OF THE BRAIN WITHOUT CONTRAST  6/13/2019 12:56 pm TECHNIQUE: Multiplanar multisequence MRI of the brain was performed without the administration of intravenous contrast. COMPARISON: CT head without contrast 06/12/2019. HISTORY: ORDERING SYSTEM PROVIDED HISTORY: NEURO DEFICIT(S), SUBACUTE, PROGRESSIVE OR FLUCTUATING TECHNOLOGIST PROVIDED HISTORY: Ordering Physician Provided Reason for Exam: Donna Britton is a 69 y.o. female that presents today, Tuesday, for evaluation of dizziness and fall. The patient states she was dizzy on Saturday and subsequently fell at home and remained on the floor all Saturday, Sunday and into Monday at which time finally she was able to get up. She arrives stating she still feels dizzy but denies chest pain, nausea, vomiting, fever, chills Acuity: Acute Type of Exam: Initial FINDINGS: INTRACRANIAL STRUCTURES/VENTRICLES: There is no acute infarct. No evidence of intracranial hemorrhage. The ventricles and cisterns are normal in size and configuration. Normal intracranial arterial flow voids are preserved. There is mild bilateral patchy cerebral white matter FLAIR hyperintensity. ORBITS: The visualized portion of the orbits demonstrate no acute abnormality. SINUSES: Trace mastoid air cell fluid. BONES/SOFT TISSUES: The bone marrow signal intensity appears normal. The soft tissues demonstrate no acute abnormality. No acute intracranial abnormality or mass lesion. There is mild nonspecific cerebral white matter disease. Treatments:   See above    Patient Instructions:    Activity: activity as tolerated  Diet: DIET GENERAL;  Dietary Nutrition Supplements: Standard High Calorie Oral Supplement  Wound Care: none

## 2019-06-15 NOTE — DISCHARGE INSTR - COC
Continuity of Care Form    Patient Name: Eliza Vee   :  1945  MRN:  [id number]    Admit date:  2019  Discharge date:  6/15/19    Code Status Order: Full Code   Advance Directives:   [facility] Documentation     Date/Time Healthcare Directive Type of Healthcare Directive Copy in [address] Agent's Name Healthcare Agent's Phone Number    19  Yes, patient has an advance directive for healthcare treatment  Durable power of  for health care  No, copy requested from family  [facility] power of   Bryant Briseno  [phone]          Admitting Physician:  Armando Cortez MD  PCP: No primary care provider on file. Discharging Nurse: Evelyn [address] Unit/Room#: A1T-1192/3125-01  Discharging Unit Phone Number: [phone]    Emergency Contact:   Extended Emergency Contact Information  Primary Emergency Contact: Merlyn De La Rosa  Address: [address] Phone: [phone]  Relation: Other  Secondary Emergency Contact: Arlin Burgess Phone: [phone]  Relation: Other    Past Surgical History:  History reviewed. No pertinent surgical history. Immunization History: There is no immunization history on file for this patient.     Active Problems:  Patient Active Problem List   Diagnosis Code    [facility] N17.9    Acute UTI (urinary tract infection) N39.0    Hypothyroidism (acquired) E03.9       Isolation/Infection:   Isolation          No Isolation            Nurse Assessment:  Last Vital Signs: BP (!) 146/69   Pulse 92   Temp 97.7 °F (36.5 °C) (Oral)   Resp 16   Ht 5' 2\" (1.575 m)   Wt 161 lb 4.8 oz (73.2 kg)   SpO2 90%   BMI 29.50 kg/m²     Last documented pain score (0-10 scale): Pain Level: 0  Last Weight:   Wt Readings from Last 1 Encounters:   19 161 lb 4.8 oz (73.2 kg)     Mental Status:  oriented and alert    IV Access:  - None    Nursing Mobility/ADLs:  Walking

## 2019-06-17 ENCOUNTER — TELEPHONE (OUTPATIENT)
Dept: FAMILY MEDICINE CLINIC | Age: 74
End: 2019-06-17

## 2019-06-17 NOTE — TELEPHONE ENCOUNTER
Pt was referred to you by her friend Pelon Ji -would like to know if you would accept her as a NP? Pt just got released from [address] also wanted me to let you know that Pelon Ji is moving to [facility].      Pl advise  [phone] (home)

## 2019-06-28 ENCOUNTER — OFFICE VISIT (OUTPATIENT)
Dept: FAMILY MEDICINE CLINIC | Age: 74
End: 2019-06-28

## 2019-06-28 VITALS
BODY MASS INDEX: 28.16 KG/M2 | HEART RATE: 72 BPM | WEIGHT: 153 LBS | DIASTOLIC BLOOD PRESSURE: 80 MMHG | SYSTOLIC BLOOD PRESSURE: 132 MMHG | HEIGHT: 62 IN | TEMPERATURE: 98.1 F

## 2019-06-28 DIAGNOSIS — N28.9 RENAL INSUFFICIENCY: ICD-10-CM

## 2019-06-28 DIAGNOSIS — E03.9 HYPOTHYROIDISM (ACQUIRED): Primary | ICD-10-CM

## 2019-06-28 DIAGNOSIS — D64.9 ANEMIA, UNSPECIFIED TYPE: ICD-10-CM

## 2019-06-28 PROCEDURE — 99202 OFFICE O/P NEW SF 15 MIN: CPT | Performed by: FAMILY MEDICINE

## 2019-06-28 ASSESSMENT — PATIENT HEALTH QUESTIONNAIRE - PHQ9
SUM OF ALL RESPONSES TO PHQ QUESTIONS 1-9: 0
2. FEELING DOWN, DEPRESSED OR HOPELESS: 0
SUM OF ALL RESPONSES TO PHQ QUESTIONS 1-9: 0
1. LITTLE INTEREST OR PLEASURE IN DOING THINGS: 0
SUM OF ALL RESPONSES TO PHQ9 QUESTIONS 1 & 2: 0

## 2019-06-28 NOTE — PROGRESS NOTES
Subjective:      Patient ID: Michele Deleon is a 69 y.o. female. Patient presents with:  Established New Doctor: pt is fasting  Follow-Up from Hospital: [facility] 6-12-19 \"dizziness\"    She is feeling much better on the thyroid medicine  She was in the hospital for falling. And getting stuck  She used to see some doctors in [place]    In hospital found to have renal insufficiency, anemia and hypothyroid     She is well and no c/o  She refuses to have colon   She is aware that she could have ca due to the anemia    No abdomen pain no ha no cp no sob  bm ok no blood  No dysphagia no gerd  No n no v  No urine sx no pain no blood  She has no fever      She is with out sx.  She tells me she feels very well    Lives by self  Former [employer] employee  And before that 25 years at [employer]  She has no children  She has been a nun in the past    YOB: 1945    Date of Visit:  6/28/2019     -- Barium Iodide -- Shortness Of Breath   -- Penicillins -- Rash    Current Outpatient Medications:  levothyroxine (SYNTHROID) 50 MCG tablet, Take 1 tablet by mouth Daily, Disp: 30 tablet, Rfl: 11  b complex vitamins capsule, Take 1 capsule by mouth daily, Disp: , Rfl:   vitamin D (CHOLECALCIFEROL) 1000 UNIT TABS tablet, Take 1,000 Units by mouth daily, Disp: , Rfl:   Multiple Vitamins-Minerals (THERAPEUTIC MULTIVITAMIN-MINERALS) tablet, Take 1 tablet by mouth daily, Disp: , Rfl:   vitamin C (ASCORBIC ACID) 500 MG tablet, Take 500 mg by mouth daily, Disp: , Rfl:   aspirin-acetaminophen-caffeine (EXCEDRIN MIGRAINE) 250-250-65 MG per tablet, Take 1 tablet by mouth every 6 hours as needed for Headaches, Disp: , Rfl:     No current facility-administered medications for this visit.       ---------------------------               06/28/19                      1013         ---------------------------   BP:          132/80         Site:    Left Upper Arm     Position:     Sitting        Cuff Size:  Medium Adult      Pulse:         72           Temp:   98.1 °F (36.7 °C)   TempSrc:      Oral          Weight: 153 lb (69.4 kg)    Height:  5' 2\" (1.575 m)   ---------------------------  Body mass index is 27.98 kg/m². Wt Readings from Last 3 Encounters:  06/28/19 : 153 lb (69.4 kg)  06/14/19 : 161 lb 4.8 oz (73.2 kg)    BP Readings from Last 3 Encounters:  06/28/19 : 132/80  06/15/19 : (!) 146/69                    Review of Systems    Objective:   Physical Exam   Constitutional: She appears well-developed and well-nourished. No distress. Eyes: No scleral icterus. Neck: Neck supple. No thyroid mass and no thyromegaly present. Cardiovascular: Normal rate, regular rhythm and normal heart sounds. Exam reveals no gallop and no friction rub. No murmur heard. Pulmonary/Chest: Effort normal and breath sounds normal. No accessory muscle usage. No tachypnea. No respiratory distress. She has no decreased breath sounds. She has no wheezes. She has no rhonchi. She has no rales. Abdominal: Soft. Normal appearance. She exhibits no mass. There is no hepatosplenomegaly. There is no tenderness. There is no rigidity and no guarding. Lymphadenopathy:     She has no cervical adenopathy. Right: No supraclavicular adenopathy present. Left: No supraclavicular adenopathy present. Neurological: She is alert. Skin: Skin is warm, dry and intact. She is not diaphoretic. No pallor. Psychiatric: She has a normal mood and affect. Her speech is normal.       Assessment:       Diagnosis Orders   1. Hypothyroidism (acquired)  TSH without Reflex    T4, Free   2. Renal insufficiency  Basic Metabolic Panel   3.  Anemia, unspecified type  Vitamin B12 & Folate    CBC Auto Differential     She declined investigation      Plan:      Get blood work in 4 weeks  Do it fasting  See me back in 3 months        Estela Frazier MD

## 2020-04-29 NOTE — TELEPHONE ENCOUNTER
Per pt prescription was changed to 75 mcg by a doctor in [place].  She stated she is healthy and feeling fine    Please call pt when complete  [phone]

## 2020-04-30 RX ORDER — LEVOTHYROXINE SODIUM 0.05 MG/1
75 TABLET ORAL DAILY
Qty: 30 TABLET | Refills: 0 | Status: SHIPPED | OUTPATIENT
Start: 2020-04-30 | End: 2020-06-25 | Stop reason: SDUPTHER

## 2020-05-01 ENCOUNTER — TELEPHONE (OUTPATIENT)
Dept: FAMILY MEDICINE CLINIC | Age: 75
End: 2020-05-01

## 2020-06-25 ENCOUNTER — OFFICE VISIT (OUTPATIENT)
Dept: FAMILY MEDICINE CLINIC | Age: 75
End: 2020-06-25

## 2020-06-25 ENCOUNTER — HOSPITAL ENCOUNTER (OUTPATIENT)
Dept: GENERAL RADIOLOGY | Age: 75
Discharge: HOME | End: 2020-06-25

## 2020-06-25 ENCOUNTER — HOSPITAL ENCOUNTER (OUTPATIENT)
Age: 75
Discharge: HOME | End: 2020-06-25

## 2020-06-25 VITALS
HEIGHT: 62 IN | HEART RATE: 80 BPM | SYSTOLIC BLOOD PRESSURE: 180 MMHG | TEMPERATURE: 97.3 F | WEIGHT: 155 LBS | DIASTOLIC BLOOD PRESSURE: 88 MMHG | BODY MASS INDEX: 28.52 KG/M2

## 2020-06-25 LAB
A/G RATIO: 1.2 (ref 1.1–2.2)
ALBUMIN SERPL-MCNC: 3.8 G/DL (ref 3.4–5)
ALP BLD-CCNC: 125 U/L (ref 40–129)
ALT SERPL-CCNC: 17 U/L (ref 10–40)
ANION GAP SERPL CALCULATED.3IONS-SCNC: 11 MMOL/L (ref 3–16)
AST SERPL-CCNC: 33 U/L (ref 15–37)
BASOPHILS ABSOLUTE: 0 K/UL (ref 0–0.2)
BASOPHILS RELATIVE PERCENT: 0.7 %
BILIRUB SERPL-MCNC: 1.2 MG/DL (ref 0–1)
BUN BLDV-MCNC: 16 MG/DL (ref 7–20)
CALCIUM SERPL-MCNC: 10.1 MG/DL (ref 8.3–10.6)
CHLORIDE BLD-SCNC: 108 MMOL/L (ref 99–110)
CO2: 22 MMOL/L (ref 21–32)
CREAT SERPL-MCNC: 1.4 MG/DL (ref 0.6–1.2)
EOSINOPHILS ABSOLUTE: 0.2 K/UL (ref 0–0.6)
EOSINOPHILS RELATIVE PERCENT: 3.4 %
FOLATE: 10.18 NG/ML (ref 4.78–24.2)
GFR AFRICAN AMERICAN: 44
GFR NON-AFRICAN AMERICAN: 37
GLOBULIN: 3.3 G/DL
GLUCOSE BLD-MCNC: 77 MG/DL (ref 70–99)
HCT VFR BLD CALC: 33.7 % (ref 36–48)
HEMOGLOBIN: 11.6 G/DL (ref 12–16)
LYMPHOCYTES ABSOLUTE: 1.2 K/UL (ref 1–5.1)
LYMPHOCYTES RELATIVE PERCENT: 24.7 %
MCH RBC QN AUTO: 32.8 PG (ref 26–34)
MCHC RBC AUTO-ENTMCNC: 34.5 G/DL (ref 31–36)
MCV RBC AUTO: 95 FL (ref 80–100)
MONOCYTES ABSOLUTE: 0.5 K/UL (ref 0–1.3)
MONOCYTES RELATIVE PERCENT: 10.9 %
NEUTROPHILS ABSOLUTE: 3 K/UL (ref 1.7–7.7)
NEUTROPHILS RELATIVE PERCENT: 60.3 %
PDW BLD-RTO: 16 % (ref 12.4–15.4)
PLATELET # BLD: 201 K/UL (ref 135–450)
PMV BLD AUTO: 6.6 FL (ref 5–10.5)
POTASSIUM SERPL-SCNC: 4.5 MMOL/L (ref 3.5–5.1)
RBC # BLD: 3.55 M/UL (ref 4–5.2)
SODIUM BLD-SCNC: 141 MMOL/L (ref 136–145)
T4 FREE: 1.3 NG/DL (ref 0.9–1.8)
TOTAL PROTEIN: 7.1 G/DL (ref 6.4–8.2)
TSH SERPL DL<=0.05 MIU/L-ACNC: 18.98 UIU/ML (ref 0.27–4.2)
VITAMIN B-12: 291 PG/ML (ref 211–911)
WBC # BLD: 4.9 K/UL (ref 4–11)

## 2020-06-25 PROCEDURE — 73030 X-RAY EXAM OF SHOULDER: CPT

## 2020-06-25 PROCEDURE — 36415 COLL VENOUS BLD VENIPUNCTURE: CPT

## 2020-06-25 PROCEDURE — 84439 ASSAY OF FREE THYROXINE: CPT

## 2020-06-25 PROCEDURE — 84443 ASSAY THYROID STIM HORMONE: CPT

## 2020-06-25 PROCEDURE — 80053 COMPREHEN METABOLIC PANEL: CPT

## 2020-06-25 PROCEDURE — 85025 COMPLETE CBC W/AUTO DIFF WBC: CPT

## 2020-06-25 PROCEDURE — 82607 VITAMIN B-12: CPT

## 2020-06-25 PROCEDURE — 99213 OFFICE O/P EST LOW 20 MIN: CPT | Performed by: FAMILY MEDICINE

## 2020-06-25 PROCEDURE — 82746 ASSAY OF FOLIC ACID SERUM: CPT

## 2020-06-25 RX ORDER — LEVOTHYROXINE SODIUM 0.07 MG/1
75 TABLET ORAL DAILY
Qty: 30 TABLET | Refills: 0 | Status: SHIPPED | OUTPATIENT
Start: 2020-06-25 | End: 2020-06-26

## 2020-06-25 ASSESSMENT — PATIENT HEALTH QUESTIONNAIRE - PHQ9
SUM OF ALL RESPONSES TO PHQ9 QUESTIONS 1 & 2: 0
SUM OF ALL RESPONSES TO PHQ QUESTIONS 1-9: 0
1. LITTLE INTEREST OR PLEASURE IN DOING THINGS: 0
SUM OF ALL RESPONSES TO PHQ QUESTIONS 1-9: 0
2. FEELING DOWN, DEPRESSED OR HOPELESS: 0

## 2020-06-25 ASSESSMENT — ENCOUNTER SYMPTOMS: SHORTNESS OF BREATH: 0

## 2020-06-25 NOTE — PROGRESS NOTES
Subjective:      Patient ID: Yasmany Vidales is a 77 y.o. female.     Patient presents with:  Check-Up: thyroid - refill medications  Fall: x 1 month - right shoulder pain    She is feeling well and she notes the change in thyroid has helped her  She fell 1-2 months ago onto her right shoulder   Tripped on a mat  Since then hurts to move and limited movement at the right shoulder   She did not go any where for this    She never did her blood work     She did see a dr Bing Greene in [place] and she did not get the blood testing done she ordered as well  She is now on 75 mcg thyroid    YOB: 1945    Date of Visit:  6/25/2020     -- Barium Iodide -- Shortness Of Breath   -- Penicillins -- Rash    Current Outpatient Medications:  levothyroxine (SYNTHROID) 50 MCG tablet, Take 1.5 tablets by mouth Daily, Disp: 30 tablet, Rfl: 0  b complex vitamins capsule, Take 1 capsule by mouth daily, Disp: , Rfl:   vitamin D (CHOLECALCIFEROL) 1000 UNIT TABS tablet, Take 1,000 Units by mouth daily, Disp: , Rfl:   Multiple Vitamins-Minerals (THERAPEUTIC MULTIVITAMIN-MINERALS) tablet, Take 1 tablet by mouth daily, Disp: , Rfl:   vitamin C (ASCORBIC ACID) 500 MG tablet, Take 500 mg by mouth daily, Disp: , Rfl:   aspirin-acetaminophen-caffeine (EXCEDRIN MIGRAINE) 250-250-65 MG per tablet, Take 1 tablet by mouth every 6 hours as needed for Headaches, Disp: , Rfl:     No current facility-administered medications for this visit.       ---------------------------------------------------------               06/25/20 06/25/20 06/25/20                    1118            1119          1141       ---------------------------------------------------------   BP:        (!) 160/88      (!) 160/88    (!) 180/88     Site:    Left Upper Arm  Left Upper ArmLeft Upper Arm   Position:     Sitting        Sitting                     Cuff Size:  Medium Adult    Medium Adult  Medium Adult    Pulse:

## 2020-06-26 RX ORDER — LEVOTHYROXINE SODIUM 0.1 MG/1
100 TABLET ORAL DAILY
Qty: 30 TABLET | Refills: 3 | Status: SHIPPED | OUTPATIENT
Start: 2020-06-26 | End: 2021-07-01 | Stop reason: SDUPTHER

## 2020-09-22 ENCOUNTER — TELEPHONE (OUTPATIENT)
Dept: FAMILY MEDICINE CLINIC | Age: 75
End: 2020-09-22

## 2020-09-22 NOTE — TELEPHONE ENCOUNTER
I would love to but she has not returned as instructed to recheck and to check blood work after the change  When she makes appt in the next week or so I will be able to send med in

## 2020-09-22 NOTE — TELEPHONE ENCOUNTER
Patient needs a refill on levothyroxine 100 mcg to [facility].       Any questions call, [phone]

## 2020-09-22 NOTE — TELEPHONE ENCOUNTER
Pt stated that she already got bw done and she is sick of getting the bw done she cant pay for this financially.   Pt thought she was told to come in for an apt in Oct.    Pl advise [phone] (home)

## 2020-09-22 NOTE — TELEPHONE ENCOUNTER
She was told end of June to see in about 2 months not October  We increased her med because the thyroid was not right  I need to be sure she is getting enough medicine    She is welcome to go back to her doctor in [facility] if she would like and that is more convenient for her

## 2020-10-20 ENCOUNTER — TELEPHONE (OUTPATIENT)
Dept: FAMILY MEDICINE CLINIC | Age: 75
End: 2020-10-20

## 2021-07-01 ENCOUNTER — OFFICE VISIT (OUTPATIENT)
Dept: FAMILY MEDICINE CLINIC | Age: 76
End: 2021-07-01

## 2021-07-01 VITALS
DIASTOLIC BLOOD PRESSURE: 88 MMHG | WEIGHT: 161 LBS | SYSTOLIC BLOOD PRESSURE: 138 MMHG | BODY MASS INDEX: 29.63 KG/M2 | HEIGHT: 62 IN | TEMPERATURE: 99.3 F

## 2021-07-01 DIAGNOSIS — N28.9 RENAL INSUFFICIENCY: ICD-10-CM

## 2021-07-01 DIAGNOSIS — E03.9 HYPOTHYROIDISM (ACQUIRED): Primary | ICD-10-CM

## 2021-07-01 DIAGNOSIS — E03.9 HYPOTHYROIDISM (ACQUIRED): ICD-10-CM

## 2021-07-01 LAB
A/G RATIO: 1.3 (ref 1.1–2.2)
ALBUMIN SERPL-MCNC: 3.8 G/DL (ref 3.4–5)
ALP BLD-CCNC: 125 U/L (ref 40–129)
ALT SERPL-CCNC: 22 U/L (ref 10–40)
ANION GAP SERPL CALCULATED.3IONS-SCNC: 9 MMOL/L (ref 3–16)
AST SERPL-CCNC: 36 U/L (ref 15–37)
BASOPHILS ABSOLUTE: 0 K/UL (ref 0–0.2)
BASOPHILS RELATIVE PERCENT: 0.6 %
BILIRUB SERPL-MCNC: 1.1 MG/DL (ref 0–1)
BUN BLDV-MCNC: 17 MG/DL (ref 7–20)
CALCIUM SERPL-MCNC: 9.6 MG/DL (ref 8.3–10.6)
CHLORIDE BLD-SCNC: 108 MMOL/L (ref 99–110)
CO2: 23 MMOL/L (ref 21–32)
CREAT SERPL-MCNC: 1.5 MG/DL (ref 0.6–1.2)
EOSINOPHILS ABSOLUTE: 0.1 K/UL (ref 0–0.6)
EOSINOPHILS RELATIVE PERCENT: 3.1 %
GFR AFRICAN AMERICAN: 41
GFR NON-AFRICAN AMERICAN: 34
GLOBULIN: 3 G/DL
GLUCOSE BLD-MCNC: 95 MG/DL (ref 70–99)
HCT VFR BLD CALC: 33.2 % (ref 36–48)
HEMOGLOBIN: 11.6 G/DL (ref 12–16)
LYMPHOCYTES ABSOLUTE: 1.2 K/UL (ref 1–5.1)
LYMPHOCYTES RELATIVE PERCENT: 24.8 %
MCH RBC QN AUTO: 33.4 PG (ref 26–34)
MCHC RBC AUTO-ENTMCNC: 35 G/DL (ref 31–36)
MCV RBC AUTO: 95.4 FL (ref 80–100)
MONOCYTES ABSOLUTE: 0.6 K/UL (ref 0–1.3)
MONOCYTES RELATIVE PERCENT: 12 %
NEUTROPHILS ABSOLUTE: 2.9 K/UL (ref 1.7–7.7)
NEUTROPHILS RELATIVE PERCENT: 59.5 %
PDW BLD-RTO: 14.7 % (ref 12.4–15.4)
PLATELET # BLD: 171 K/UL (ref 135–450)
PMV BLD AUTO: 6.7 FL (ref 5–10.5)
POTASSIUM SERPL-SCNC: 4.6 MMOL/L (ref 3.5–5.1)
RBC # BLD: 3.47 M/UL (ref 4–5.2)
SODIUM BLD-SCNC: 140 MMOL/L (ref 136–145)
T4 FREE: 1.2 NG/DL (ref 0.9–1.8)
TOTAL PROTEIN: 6.8 G/DL (ref 6.4–8.2)
TSH SERPL DL<=0.05 MIU/L-ACNC: 8.47 UIU/ML (ref 0.27–4.2)
WBC # BLD: 4.8 K/UL (ref 4–11)

## 2021-07-01 PROCEDURE — 99213 OFFICE O/P EST LOW 20 MIN: CPT | Performed by: FAMILY MEDICINE

## 2021-07-01 RX ORDER — LEVOTHYROXINE SODIUM 0.1 MG/1
100 TABLET ORAL DAILY
Qty: 90 TABLET | Refills: 1 | Status: SHIPPED | OUTPATIENT
Start: 2021-07-01 | End: 2021-08-30

## 2021-07-01 SDOH — ECONOMIC STABILITY: FOOD INSECURITY: WITHIN THE PAST 12 MONTHS, YOU WORRIED THAT YOUR FOOD WOULD RUN OUT BEFORE YOU GOT MONEY TO BUY MORE.: NEVER TRUE

## 2021-07-01 SDOH — ECONOMIC STABILITY: FOOD INSECURITY: WITHIN THE PAST 12 MONTHS, THE FOOD YOU BOUGHT JUST DIDN'T LAST AND YOU DIDN'T HAVE MONEY TO GET MORE.: NEVER TRUE

## 2021-07-01 ASSESSMENT — PATIENT HEALTH QUESTIONNAIRE - PHQ9
SUM OF ALL RESPONSES TO PHQ QUESTIONS 1-9: 0
1. LITTLE INTEREST OR PLEASURE IN DOING THINGS: 0
SUM OF ALL RESPONSES TO PHQ9 QUESTIONS 1 & 2: 0
SUM OF ALL RESPONSES TO PHQ QUESTIONS 1-9: 0
2. FEELING DOWN, DEPRESSED OR HOPELESS: 0
SUM OF ALL RESPONSES TO PHQ QUESTIONS 1-9: 0

## 2021-07-01 ASSESSMENT — SOCIAL DETERMINANTS OF HEALTH (SDOH): HOW HARD IS IT FOR YOU TO PAY FOR THE VERY BASICS LIKE FOOD, HOUSING, MEDICAL CARE, AND HEATING?: NOT HARD AT ALL

## 2021-07-01 NOTE — PROGRESS NOTES
Subjective:      Patient ID: Nikki Dior is a 77 y.o. female. Chief Complaint   Patient presents with    Check-Up     thyroid - pt is not fasting        Patient presents with:  Check-Up: thyroid - pt is not fasting    She is here for the above  She is well and no c/o  She notes the 100 mcg does well for her     YOB: 1945    Date of Visit:  7/1/2021     -- Barium Iodide -- Shortness Of Breath   -- Penicillins -- Rash    Current Outpatient Medications:  levothyroxine (SYNTHROID) 100 MCG tablet, Take 1 tablet by mouth Daily, Disp: 90 tablet, Rfl: 1  b complex vitamins capsule, Take 1 capsule by mouth daily, Disp: , Rfl:   vitamin D (CHOLECALCIFEROL) 1000 UNIT TABS tablet, Take 1,000 Units by mouth daily, Disp: , Rfl:   Multiple Vitamins-Minerals (THERAPEUTIC MULTIVITAMIN-MINERALS) tablet, Take 1 tablet by mouth daily, Disp: , Rfl:   vitamin C (ASCORBIC ACID) 500 MG tablet, Take 500 mg by mouth daily, Disp: , Rfl:   aspirin-acetaminophen-caffeine (EXCEDRIN MIGRAINE) 250-250-65 MG per tablet, Take 1 tablet by mouth every 6 hours as needed for Headaches, Disp: , Rfl:     No current facility-administered medications for this visit.      ---------------------------               07/01/21                      1119         ---------------------------   BP:          138/88         Site:    Left Upper Arm     Position:     Sitting        Cuff Size:   Large Adult      Temp:   99.3 °F (37.4 °C)   TempSrc:    Temporal        Weight:  161 lb (73 kg)     Height:  5' 2\" (1.575 m)   ---------------------------  Body mass index is 29.45 kg/m².      Wt Readings from Last 3 Encounters:  07/01/21 : 161 lb (73 kg)  06/25/20 : 155 lb (70.3 kg)  06/28/19 : 153 lb (69.4 kg)    BP Readings from Last 3 Encounters:  07/01/21 : 138/88  06/25/20 : (!) 180/88  06/28/19 : 132/80            Review of Systems    Objective:   Physical Exam  Constitutional:       General: She is not in acute

## 2021-08-30 ENCOUNTER — TELEPHONE (OUTPATIENT)
Dept: FAMILY MEDICINE CLINIC | Age: 76
End: 2021-08-30

## 2021-08-30 RX ORDER — LEVOTHYROXINE SODIUM 112 UG/1
112 TABLET ORAL DAILY
Qty: 90 TABLET | Refills: 1 | Status: SHIPPED | OUTPATIENT
Start: 2021-08-30 | End: 2022-08-09 | Stop reason: SDUPTHER

## 2021-08-30 NOTE — TELEPHONE ENCOUNTER
Patient would like refill on levothyroxine (SYNTHROID) 100 MCG tablet       Patient wanted me to remind you  that at last appt' that you wanted her to up script to [facility] when she got a refill. [facility] [phone]    Jose Bloom [phone]

## 2021-12-02 ENCOUNTER — TELEPHONE (OUTPATIENT)
Dept: FAMILY MEDICINE CLINIC | Age: 76
End: 2021-12-02

## 2021-12-02 RX ORDER — CIPROFLOXACIN 500 MG/1
500 TABLET, FILM COATED ORAL 2 TIMES DAILY
Qty: 14 TABLET | Refills: 0 | Status: SHIPPED | OUTPATIENT
Start: 2021-12-02 | End: 2021-12-09

## 2021-12-02 NOTE — TELEPHONE ENCOUNTER
----- Message from Lorimor sent at 12/2/2021 10:48 AM EST -----  Subject: Message to Provider    QUESTIONS  Information for Provider? Pt is asking if her provider can send in a   prescription for Ciprofloxacin 500 Mg, twice daily. Pt is needing a 14 day   supply. Please send to [facility] in [place]. Pt doesn't want to come in with   the new stand of COVID that's out. Please call pt back if he will or not   send this in.   ---------------------------------------------------------------------------  --------------  CALL BACK INFO  What is the best way for the office to contact you? OK to leave message on   voicemail  Preferred Call Back Phone Number? [phone]  ---------------------------------------------------------------------------  --------------  SCRIPT ANSWERS  Relationship to Patient?  Self

## 2021-12-02 NOTE — TELEPHONE ENCOUNTER
Di Luther  She has not had a urine test in several years  I will not give her 14 days  I will send in 7 days     Orders Placed This Encounter   Medications    ciprofloxacin (CIPRO) 500 MG tablet     Sig: Take 1 tablet by mouth 2 times daily for 7 days     Dispense:  14 tablet     Refill:  0         (She did not come her for uti 2 months ago )

## 2022-06-06 RX ORDER — CIPROFLOXACIN 500 MG/1
TABLET, FILM COATED ORAL
Qty: 14 TABLET | Refills: 0 | OUTPATIENT
Start: 2022-06-06

## 2022-06-07 RX ORDER — CIPROFLOXACIN 500 MG/1
TABLET, FILM COATED ORAL
Qty: 14 TABLET | Refills: 0 | OUTPATIENT
Start: 2022-06-07

## 2022-06-08 NOTE — TELEPHONE ENCOUNTER
Santos Garcia advised and verbalized understanding. She states she doesn't want to come in to the office just yet.

## 2022-08-08 ENCOUNTER — OFFICE VISIT (OUTPATIENT)
Dept: FAMILY MEDICINE CLINIC | Age: 77
End: 2022-08-08

## 2022-08-08 VITALS
HEART RATE: 80 BPM | SYSTOLIC BLOOD PRESSURE: 170 MMHG | HEIGHT: 62 IN | WEIGHT: 158 LBS | TEMPERATURE: 97.9 F | DIASTOLIC BLOOD PRESSURE: 90 MMHG | BODY MASS INDEX: 29.08 KG/M2

## 2022-08-08 DIAGNOSIS — E03.9 HYPOTHYROIDISM (ACQUIRED): Primary | ICD-10-CM

## 2022-08-08 DIAGNOSIS — Z13.220 SCREENING FOR CHOLESTEROL LEVEL: ICD-10-CM

## 2022-08-08 DIAGNOSIS — I10 PRIMARY HYPERTENSION: ICD-10-CM

## 2022-08-08 DIAGNOSIS — D64.9 ANEMIA, UNSPECIFIED TYPE: ICD-10-CM

## 2022-08-08 PROCEDURE — 99211 OFF/OP EST MAY X REQ PHY/QHP: CPT | Performed by: FAMILY MEDICINE

## 2022-08-08 PROCEDURE — 1123F ACP DISCUSS/DSCN MKR DOCD: CPT | Performed by: FAMILY MEDICINE

## 2022-08-08 RX ORDER — TRIAMTERENE AND HYDROCHLOROTHIAZIDE 37.5; 25 MG/1; MG/1
1 CAPSULE ORAL DAILY
Qty: 30 CAPSULE | Refills: 3 | Status: SHIPPED | OUTPATIENT
Start: 2022-08-08 | End: 2022-09-06 | Stop reason: ALTCHOICE

## 2022-08-08 RX ORDER — CIPROFLOXACIN 500 MG/1
500 TABLET, FILM COATED ORAL 2 TIMES DAILY
Qty: 14 TABLET | Refills: 0 | Status: SHIPPED | OUTPATIENT
Start: 2022-08-08 | End: 2022-09-06

## 2022-08-08 SDOH — ECONOMIC STABILITY: TRANSPORTATION INSECURITY
IN THE PAST 12 MONTHS, HAS THE LACK OF TRANSPORTATION KEPT YOU FROM MEDICAL APPOINTMENTS OR FROM GETTING MEDICATIONS?: NO

## 2022-08-08 SDOH — ECONOMIC STABILITY: FOOD INSECURITY: WITHIN THE PAST 12 MONTHS, THE FOOD YOU BOUGHT JUST DIDN'T LAST AND YOU DIDN'T HAVE MONEY TO GET MORE.: NEVER TRUE

## 2022-08-08 SDOH — ECONOMIC STABILITY: TRANSPORTATION INSECURITY
IN THE PAST 12 MONTHS, HAS LACK OF TRANSPORTATION KEPT YOU FROM MEETINGS, WORK, OR FROM GETTING THINGS NEEDED FOR DAILY LIVING?: NO

## 2022-08-08 SDOH — ECONOMIC STABILITY: FOOD INSECURITY: WITHIN THE PAST 12 MONTHS, YOU WORRIED THAT YOUR FOOD WOULD RUN OUT BEFORE YOU GOT MONEY TO BUY MORE.: NEVER TRUE

## 2022-08-08 ASSESSMENT — PATIENT HEALTH QUESTIONNAIRE - PHQ9
SUM OF ALL RESPONSES TO PHQ QUESTIONS 1-9: 0
1. LITTLE INTEREST OR PLEASURE IN DOING THINGS: 0
SUM OF ALL RESPONSES TO PHQ9 QUESTIONS 1 & 2: 0
SUM OF ALL RESPONSES TO PHQ QUESTIONS 1-9: 0
2. FEELING DOWN, DEPRESSED OR HOPELESS: 0

## 2022-08-08 ASSESSMENT — SOCIAL DETERMINANTS OF HEALTH (SDOH): HOW HARD IS IT FOR YOU TO PAY FOR THE VERY BASICS LIKE FOOD, HOUSING, MEDICAL CARE, AND HEATING?: NOT HARD AT ALL

## 2022-08-08 ASSESSMENT — ENCOUNTER SYMPTOMS
COUGH: 0
SHORTNESS OF BREATH: 0
ABDOMINAL PAIN: 0

## 2022-08-08 NOTE — PATIENT INSTRUCTIONS
Do take the medicine correctly  Start the new blood pressure pill  See me back in about 4 week  Do the blood work right before seeing me   Take the antibiotic

## 2022-08-08 NOTE — PROGRESS NOTES
Subjective:      Patient ID: Denisse Ramires is a 69 y.o. female. Chief Complaint   Patient presents with    Dizziness     X 2-3 weeks    Urinary Tract Infection     Urgency x 1-2 weeks    Medication Refill     thyroid        Patient presents with:  Dizziness: X 2-3 weeks  Urinary Tract Infection: Urgency x 1-2 weeks  Medication Refill: thyroid    Urgency of urine no blood no pain  Frequency for 2 week  No fever  No abd pain    She recently had uti treated by [place] in [place] end of June  She tells me she did get better  Occ dizzy ~2 week  With standing too fast  If she sleeps it is fine and she has no sx and makes her feel better    She tells me she does not take the thyroid at times    She tells me she still eats a lot of salt and she does not want to change.      YOB: 1945    Date of Visit:  8/8/2022     -- Barium Iodide -- Shortness Of Breath   -- Penicillins -- Rash    Current Outpatient Medications:    levothyroxine (SYNTHROID) 112 MCG tablet, Take 1 tablet by mouth Daily, Disp: 90 tablet, Rfl: 1  b complex vitamins capsule, Take 1 capsule by mouth daily, Disp: , Rfl:   vitamin D (CHOLECALCIFEROL) 1000 UNIT TABS tablet, Take 1,000 Units by mouth daily, Disp: , Rfl:   Multiple Vitamins-Minerals (THERAPEUTIC MULTIVITAMIN-MINERALS) tablet, Take 1 tablet by mouth daily, Disp: , Rfl:   vitamin C (ASCORBIC ACID) 500 MG tablet, Take 500 mg by mouth daily, Disp: , Rfl:   aspirin-acetaminophen-caffeine (EXCEDRIN MIGRAINE) 250-250-65 MG per tablet, Take 1 tablet by mouth every 6 hours as needed for Headaches (Patient not taking: Reported on 8/8/2022), Disp: , Rfl:     No current facility-administered medications for this visit.      ---------------------------------------------------------               08/08/22 08/08/22 08/08/22                    1355            1358          1413       ---------------------------------------------------------   BP:        (!) 146/80      (!) 146/80    (!) 170/90     Site:    Left Upper Arm  Left Upper ArmLeft Upper Arm   Position:     Sitting        Sitting                     Cuff Size:  Medium Adult    Medium Adult  Medium Adult    Pulse:                                       80         Temp:   97.9 °F (36.6 °C)                               TempSrc:    Temporal                                    Weight: 158 lb (71.7 kg)                                Height:  5' 2\" (1.575 m)                               ---------------------------------------------------------  Body mass index is 28.9 kg/m². Wt Readings from Last 3 Encounters:  08/08/22 : 158 lb (71.7 kg)  07/01/21 : 161 lb (73 kg)  06/25/20 : 155 lb (70.3 kg)    BP Readings from Last 3 Encounters:  08/08/22 : (!) 170/90  07/01/21 : 138/88  06/25/20 : (!) 180/88                  Review of Systems   Constitutional:  Negative for chills and fever. Respiratory:  Negative for cough and shortness of breath. Cardiovascular:  Negative for chest pain, palpitations and leg swelling. Gastrointestinal:  Negative for abdominal pain. Neurological:  Negative for headaches. Objective:   Physical Exam  Constitutional:       General: She is not in acute distress. Appearance: Normal appearance. She is well-developed. She is not ill-appearing or diaphoretic. Cardiovascular:      Rate and Rhythm: Normal rate and regular rhythm. Heart sounds: Murmur heard. Systolic murmur is present with a grade of 2/6. No friction rub. No gallop. Comments: Murmur heard best along the upper right sternum  Pulmonary:      Effort: Pulmonary effort is normal. No tachypnea, accessory muscle usage or respiratory distress. Breath sounds: Normal breath sounds. No decreased breath sounds, wheezing, rhonchi or rales. Chest:   Breasts:     Right: No supraclavicular adenopathy. Left: No supraclavicular adenopathy.    Lymphadenopathy:      Cervical: No cervical adenopathy. Upper Body:      Right upper body: No supraclavicular adenopathy. Left upper body: No supraclavicular adenopathy. Skin:     General: Skin is warm and dry. Coloration: Skin is not pale. Neurological:      Mental Status: She is alert. Assessment:       Diagnosis Orders   1. Hypothyroidism (acquired)  TSH    T4, Free      2. Primary hypertension  Comprehensive Metabolic Panel    CBC with Auto Differential      3. Anemia, unspecified type  Vitamin B12 & Folate      4. Screening for cholesterol level          Orders Placed This Encounter   Medications    ciprofloxacin (CIPRO) 500 MG tablet     Sig: Take 1 tablet by mouth in the morning and 1 tablet before bedtime. Dispense:  14 tablet     Refill:  0    triamterene-hydroCHLOROthiazide (DYAZIDE) 37.5-25 MG per capsule     Sig: Take 1 capsule by mouth in the morning. Dispense:  30 capsule     Refill:  3       Her bp is high and does run high at several other offcies  Will start med  Lipid perfect at [facility] on 2/11/20  Regarding the anemia she refused to see heme onc last year see lab note 7/1/21  I asked again about routine care  She refuses intervention again today  Specifically she refuses colon check, refuses mammogram, she refuses to have vaccines.  She has refused in past as well on several occassions     I reviewed the NP visit and the culture she had was negative from 6/28/22 at a urgent care      Patient is with no insurance and funds are stretched       Plan:      Do take the medicine correctly  Start the new blood pressure pill  See me back in about 4 week  Do the blood work right before seeing me   Take the antibiotic          Fatmata Sewell MD

## 2022-08-09 ENCOUNTER — TELEPHONE (OUTPATIENT)
Dept: FAMILY MEDICINE CLINIC | Age: 77
End: 2022-08-09

## 2022-08-09 RX ORDER — LEVOTHYROXINE SODIUM 112 UG/1
112 TABLET ORAL DAILY
Qty: 15 TABLET | Refills: 0 | Status: SHIPPED | OUTPATIENT
Start: 2022-08-09 | End: 2022-09-06

## 2022-08-09 NOTE — TELEPHONE ENCOUNTER
Pt is needing a refill on   levothyroxine (SYNTHROID) 112 MCG tablet   [facility] in [place]     pt is only needing about 15 pills.      Pt is scheduled for 9/6/22

## 2022-08-09 NOTE — TELEPHONE ENCOUNTER
Done    Orders Placed This Encounter   Medications    levothyroxine (SYNTHROID) 112 MCG tablet     Sig: Take 1 tablet by mouth in the morning.      Dispense:  15 tablet     Refill:  0

## 2022-08-24 DIAGNOSIS — I10 PRIMARY HYPERTENSION: ICD-10-CM

## 2022-08-24 DIAGNOSIS — E03.9 HYPOTHYROIDISM (ACQUIRED): ICD-10-CM

## 2022-08-24 DIAGNOSIS — D64.9 ANEMIA, UNSPECIFIED TYPE: ICD-10-CM

## 2022-08-24 LAB
A/G RATIO: 1.2 (ref 1.1–2.2)
ALBUMIN SERPL-MCNC: 3.6 G/DL (ref 3.4–5)
ALP BLD-CCNC: 119 U/L (ref 40–129)
ALT SERPL-CCNC: 16 U/L (ref 10–40)
ANION GAP SERPL CALCULATED.3IONS-SCNC: 11 MMOL/L (ref 3–16)
AST SERPL-CCNC: 30 U/L (ref 15–37)
BASOPHILS ABSOLUTE: 0 K/UL (ref 0–0.2)
BASOPHILS RELATIVE PERCENT: 1 %
BILIRUB SERPL-MCNC: 1.4 MG/DL (ref 0–1)
BUN BLDV-MCNC: 21 MG/DL (ref 7–20)
CALCIUM SERPL-MCNC: 9.9 MG/DL (ref 8.3–10.6)
CHLORIDE BLD-SCNC: 102 MMOL/L (ref 99–110)
CO2: 25 MMOL/L (ref 21–32)
CREAT SERPL-MCNC: 1.9 MG/DL (ref 0.6–1.2)
EOSINOPHILS ABSOLUTE: 0.1 K/UL (ref 0–0.6)
EOSINOPHILS RELATIVE PERCENT: 3 %
FOLATE: 9.86 NG/ML (ref 4.78–24.2)
GFR AFRICAN AMERICAN: 31
GFR NON-AFRICAN AMERICAN: 26
GLUCOSE BLD-MCNC: 104 MG/DL (ref 70–99)
HCT VFR BLD CALC: 34.3 % (ref 36–48)
HEMOGLOBIN: 11.9 G/DL (ref 12–16)
LYMPHOCYTES ABSOLUTE: 1.4 K/UL (ref 1–5.1)
LYMPHOCYTES RELATIVE PERCENT: 30.1 %
MCH RBC QN AUTO: 33.2 PG (ref 26–34)
MCHC RBC AUTO-ENTMCNC: 34.5 G/DL (ref 31–36)
MCV RBC AUTO: 96 FL (ref 80–100)
MONOCYTES ABSOLUTE: 0.5 K/UL (ref 0–1.3)
MONOCYTES RELATIVE PERCENT: 11 %
NEUTROPHILS ABSOLUTE: 2.5 K/UL (ref 1.7–7.7)
NEUTROPHILS RELATIVE PERCENT: 54.9 %
PDW BLD-RTO: 14.5 % (ref 12.4–15.4)
PLATELET # BLD: 179 K/UL (ref 135–450)
PMV BLD AUTO: 7 FL (ref 5–10.5)
POTASSIUM SERPL-SCNC: 4 MMOL/L (ref 3.5–5.1)
RBC # BLD: 3.58 M/UL (ref 4–5.2)
SODIUM BLD-SCNC: 138 MMOL/L (ref 136–145)
T4 FREE: 2.1 NG/DL (ref 0.9–1.8)
TOTAL PROTEIN: 6.7 G/DL (ref 6.4–8.2)
TSH SERPL DL<=0.05 MIU/L-ACNC: 0.44 UIU/ML (ref 0.27–4.2)
VITAMIN B-12: 323 PG/ML (ref 211–911)
WBC # BLD: 4.6 K/UL (ref 4–11)

## 2022-09-06 ENCOUNTER — OFFICE VISIT (OUTPATIENT)
Dept: FAMILY MEDICINE CLINIC | Age: 77
End: 2022-09-06

## 2022-09-06 VITALS
WEIGHT: 154 LBS | HEART RATE: 84 BPM | HEIGHT: 62 IN | BODY MASS INDEX: 28.34 KG/M2 | DIASTOLIC BLOOD PRESSURE: 92 MMHG | TEMPERATURE: 97.9 F | SYSTOLIC BLOOD PRESSURE: 150 MMHG

## 2022-09-06 DIAGNOSIS — E03.9 HYPOTHYROIDISM (ACQUIRED): ICD-10-CM

## 2022-09-06 DIAGNOSIS — N28.9 RENAL INSUFFICIENCY: ICD-10-CM

## 2022-09-06 DIAGNOSIS — I10 PRIMARY HYPERTENSION: Primary | ICD-10-CM

## 2022-09-06 PROCEDURE — 1123F ACP DISCUSS/DSCN MKR DOCD: CPT | Performed by: FAMILY MEDICINE

## 2022-09-06 PROCEDURE — 99213 OFFICE O/P EST LOW 20 MIN: CPT | Performed by: FAMILY MEDICINE

## 2022-09-06 RX ORDER — AMLODIPINE BESYLATE 2.5 MG/1
2.5 TABLET ORAL DAILY
Qty: 30 TABLET | Refills: 3 | Status: SHIPPED | OUTPATIENT
Start: 2022-09-06

## 2022-09-06 RX ORDER — FERROUS SULFATE 325(65) MG
325 TABLET ORAL DAILY
COMMUNITY
End: 2022-09-06

## 2022-09-06 RX ORDER — LEVOTHYROXINE SODIUM 0.1 MG/1
100 TABLET ORAL DAILY
Qty: 30 TABLET | Refills: 3 | Status: SHIPPED | OUTPATIENT
Start: 2022-09-06 | End: 2022-10-31

## 2022-09-06 NOTE — PROGRESS NOTES
Subjective:      Patient ID: Liv Solano is a 69 y.o. female. 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  She has no c/o  She got little weak and dizzy and fell in yard  No loc    No sob no cp no palpitation  No ha   No abd pain  No n no v   Bm ok no blood   Urine passing well no pain no blood    She still uses salt and still carries a salt shaker in the car    YOB: 1945    Date of Visit:  9/6/2022     -- Barium Iodide -- Shortness Of Breath   -- Penicillins -- Rash    Current Outpatient Medications:  ferrous sulfate (IRON 325) 325 (65 Fe) MG tablet, Take 325 mg by mouth daily, Disp: , Rfl:   levothyroxine (SYNTHROID) 112 MCG tablet, Take 1 tablet by mouth in the morning., Disp: 15 tablet, Rfl: 0  triamterene-hydroCHLOROthiazide (DYAZIDE) 37.5-25 MG per capsule, Take 1 capsule by mouth in the morning., Disp: 30 capsule, Rfl: 3  b complex vitamins capsule, Take 1 capsule by mouth daily, Disp: , Rfl:   vitamin D (CHOLECALCIFEROL) 1000 UNIT TABS tablet, Take 1,000 Units by mouth daily, Disp: , Rfl:   Multiple Vitamins-Minerals (THERAPEUTIC MULTIVITAMIN-MINERALS) tablet, Take 1 tablet by mouth daily, Disp: , Rfl:   vitamin C (ASCORBIC ACID) 500 MG tablet, Take 500 mg by mouth daily, Disp: , Rfl:     No current facility-administered medications for this visit.      ------------------------------------------               09/06/22 09/06/22                    1407            1439       ------------------------------------------   BP:        (!) 158/98      (!) 150/92     Site:    Left Upper Arm  Left Upper Arm   Position:     Sitting                      Cuff Size:  Medium Adult    Medium Adult    Pulse:                         84 Temp:   97.9 °F (36.6 °C)                 TempSrc:    Temporal                      Weight: 154 lb (69.9 kg)                  Height:  5' 2\" (1.575 m)                 ------------------------------------------  Body mass index is 28.17 kg/m². Wt Readings from Last 3 Encounters:  09/06/22 : 154 lb (69.9 kg)  08/08/22 : 158 lb (71.7 kg)  07/01/21 : 161 lb (73 kg)    BP Readings from Last 3 Encounters:  09/06/22 : (!) 150/92  08/08/22 : (!) 170/90  07/01/21 : 138/88                Review of Systems    Objective:   Physical Exam  Constitutional:       General: She is not in acute distress. Appearance: She is well-developed. She is not diaphoretic. Cardiovascular:      Rate and Rhythm: Normal rate and regular rhythm. Heart sounds: Murmur heard. Systolic murmur is present with a grade of 1/6. No friction rub. No gallop. Comments: Murmur at the left sternum  Pulmonary:      Effort: Pulmonary effort is normal. No tachypnea, accessory muscle usage or respiratory distress. Breath sounds: Normal breath sounds. No decreased breath sounds, wheezing, rhonchi or rales. Chest:   Breasts:     Right: No supraclavicular adenopathy. Left: No supraclavicular adenopathy. Lymphadenopathy:      Cervical: No cervical adenopathy. Upper Body:      Right upper body: No supraclavicular adenopathy. Left upper body: No supraclavicular adenopathy. Skin:     General: Skin is warm and dry. Coloration: Skin is not pale. Neurological:      Mental Status: She is alert. Assessment:       Diagnosis Orders   1. Primary hypertension        2. Hypothyroidism (acquired)        3.  Renal insufficiency          Orders Placed This Encounter   Medications    levothyroxine (SYNTHROID) 100 MCG tablet     Sig: Take 1 tablet by mouth Daily     Dispense:  30 tablet     Refill:  3     New dose    amLODIPine (NORVASC) 2.5 MG tablet     Sig: Take 1 tablet by mouth daily     Dispense:  30 tablet     Refill:  3       Discussed all her labs she had done especially the renal function/cbc. thyroid  She did not want to do cardiac w/u such as echo consult   She still refuses to do a colon check. I asked again today. She has had normal iron and b12 studies in past  I asked about seeing a hematologist as well and she refused as well   I wanted to do mammogram and she refused to have done. \"NO\"  I asked about getting vaccines and she refused     She did see nephrology for the renal function on 6/2019 during hospital stay.  A normal renal ultrasound was obtained on 6/14/19 as well        Plan:      Do stop the iron  Decrease the dose of the thyroid medicine  I am going to have you stop the water pill  I am going to have you use a different blood pressure pill   See us in 6 weeks         Justine Ellis MD

## 2022-09-06 NOTE — PATIENT INSTRUCTIONS
Do stop the iron  Decrease the dose of the thyroid medicine  I am going to have you stop the water pill  I am going to have you use a different blood pressure pill   See us in 6 weeks

## 2022-09-07 ENCOUNTER — TELEPHONE (OUTPATIENT)
Dept: FAMILY MEDICINE CLINIC | Age: 77
End: 2022-09-07

## 2022-09-07 NOTE — TELEPHONE ENCOUNTER
When the pt went to the pharmacy to  her rx's the pharmacist mentioned to her that the amlodipine can cause dizziness, the pt became concerned because that's what she is seeing Dr Lashaun Carrasco for. Pt is not having any dizziness currently, has not checked her BP does not have a bp cuff. Has not started the new bp med yet plans to take at night and asking if ok to take at night. Pt not able to check her Bp at home  Also, asking if you think the levothyroxine could be causing the dizziness. Please advise.

## 2022-09-08 NOTE — TELEPHONE ENCOUNTER
At least try the medicine  For her bp  Ok to take at night  Stay on thyroid medicine  See back as scheduled

## 2022-10-20 ENCOUNTER — OFFICE VISIT (OUTPATIENT)
Dept: FAMILY MEDICINE CLINIC | Age: 77
End: 2022-10-20

## 2022-10-20 VITALS
TEMPERATURE: 98.1 F | WEIGHT: 158 LBS | DIASTOLIC BLOOD PRESSURE: 80 MMHG | SYSTOLIC BLOOD PRESSURE: 138 MMHG | BODY MASS INDEX: 29.08 KG/M2 | HEIGHT: 62 IN

## 2022-10-20 DIAGNOSIS — N28.9 RENAL INSUFFICIENCY: ICD-10-CM

## 2022-10-20 DIAGNOSIS — I10 PRIMARY HYPERTENSION: Primary | ICD-10-CM

## 2022-10-20 DIAGNOSIS — E05.90 HYPERTHYROIDISM: ICD-10-CM

## 2022-10-20 PROCEDURE — 1123F ACP DISCUSS/DSCN MKR DOCD: CPT | Performed by: FAMILY MEDICINE

## 2022-10-20 PROCEDURE — 99214 OFFICE O/P EST MOD 30 MIN: CPT | Performed by: FAMILY MEDICINE

## 2022-10-20 ASSESSMENT — PATIENT HEALTH QUESTIONNAIRE - PHQ9
SUM OF ALL RESPONSES TO PHQ QUESTIONS 1-9: 0
SUM OF ALL RESPONSES TO PHQ QUESTIONS 1-9: 0
2. FEELING DOWN, DEPRESSED OR HOPELESS: 0
SUM OF ALL RESPONSES TO PHQ QUESTIONS 1-9: 0
SUM OF ALL RESPONSES TO PHQ QUESTIONS 1-9: 0
SUM OF ALL RESPONSES TO PHQ9 QUESTIONS 1 & 2: 0
1. LITTLE INTEREST OR PLEASURE IN DOING THINGS: 0

## 2022-10-20 NOTE — PROGRESS NOTES
Subjective:      Patient ID: Joy Byrnes is a 69 y.o. female. Chief Complaint   Patient presents with    Follow-up     Hypertension, thyroid        Patient presents with: Follow-up: Hypertension, thyroid    She is here for the above  She tells me she is feeling well  She has no c/o    Getting around  She tells me on the meds  We reviewed her meds with her as she had some questions on same    No fever  Appetite is ok no change  No cp no sob  No ha  Urine is passing well and no cx. No pain no blood    Will occ get a pain with standing on the bottom of the heel on the right no injury      YOB: 1945    Date of Visit:  10/20/2022     -- Barium Iodide -- Shortness Of Breath   -- Penicillins -- Rash    Current Outpatient Medications:  levothyroxine (SYNTHROID) 100 MCG tablet, Take 1 tablet by mouth Daily, Disp: 30 tablet, Rfl: 3  amLODIPine (NORVASC) 2.5 MG tablet, Take 1 tablet by mouth daily, Disp: 30 tablet, Rfl: 3  b complex vitamins capsule, Take 1 capsule by mouth daily, Disp: , Rfl:   vitamin D (CHOLECALCIFEROL) 1000 UNIT TABS tablet, Take 1,000 Units by mouth daily, Disp: , Rfl:   Multiple Vitamins-Minerals (THERAPEUTIC MULTIVITAMIN-MINERALS) tablet, Take 1 tablet by mouth daily, Disp: , Rfl:   vitamin C (ASCORBIC ACID) 500 MG tablet, Take 500 mg by mouth daily, Disp: , Rfl:     No current facility-administered medications for this visit.      ---------------------------               10/20/22                      1319         ---------------------------   BP:          138/80         Site:    Left Upper Arm     Position:     Sitting        Cuff Size:  Medium Adult      Temp:   98.1 °F (36.7 °C)   TempSrc:    Temporal        Weight: 158 lb (71.7 kg)    Height:  5' 2\" (1.575 m)   ---------------------------  Body mass index is 28.9 kg/m².      Wt Readings from Last 3 Encounters:  10/20/22 : 158 lb (71.7 kg)  09/06/22 : 154 lb (69.9 kg)  08/08/22 : 158 lb (71.7 kg)    BP Readings from Last 3 Encounters:  10/20/22 : 138/80  09/06/22 : (!) 150/92  08/08/22 : (!) 170/90          Review of Systems    Objective:   Physical Exam  Constitutional:       General: She is not in acute distress. Appearance: Normal appearance. She is well-developed. She is not ill-appearing or diaphoretic. Cardiovascular:      Rate and Rhythm: Normal rate and regular rhythm. Pulses:           Dorsalis pedis pulses are 2+ on the right side. Posterior tibial pulses are 2+ on the right side. Heart sounds: Murmur heard. Systolic murmur is present with a grade of 1/6. No friction rub. No gallop. Comments: No edema legs  Pulmonary:      Effort: Pulmonary effort is normal. No tachypnea, accessory muscle usage or respiratory distress. Breath sounds: Normal breath sounds. No decreased breath sounds, wheezing, rhonchi or rales. Musculoskeletal:      Comments: Right foot warm pink no lesion  Supple at the ankle no pain   She has no marks  Tender to palpate at the bottom of the heel no lumps    Lymphadenopathy:      Cervical: No cervical adenopathy. Upper Body:      Right upper body: No supraclavicular adenopathy. Left upper body: No supraclavicular adenopathy. Skin:     General: Skin is warm and dry. Coloration: Skin is not pale. Neurological:      Mental Status: She is alert. Assessment:       Diagnosis Orders   1. Primary hypertension  Basic Metabolic Panel    Urinalysis with Microscopic      2. Hyperthyroidism  TSH    T4, Free      3.  Renal insufficiency  Urinalysis with Microscopic      We had to adjust the medicine for the thyroid last visit  She seems to be well   No change  She is instructed on the meds and labs  She is using inserts  Instructed her on the exercises and printed off same       Plan:      Stay with the current medicines  Do take fluids  Check the blood today   See me in 4 months   Tylenol and stretches for the foot  Call if not better         Hermelinda Alonzo MD

## 2022-10-26 DIAGNOSIS — I10 PRIMARY HYPERTENSION: ICD-10-CM

## 2022-10-26 DIAGNOSIS — N28.9 RENAL INSUFFICIENCY: ICD-10-CM

## 2022-10-26 DIAGNOSIS — E05.90 HYPERTHYROIDISM: ICD-10-CM

## 2022-10-26 LAB
ANION GAP SERPL CALCULATED.3IONS-SCNC: 11 MMOL/L (ref 3–16)
BACTERIA: ABNORMAL /HPF
BILIRUBIN URINE: NEGATIVE
BLOOD, URINE: ABNORMAL
BUN BLDV-MCNC: 18 MG/DL (ref 7–20)
CALCIUM SERPL-MCNC: 9.7 MG/DL (ref 8.3–10.6)
CHLORIDE BLD-SCNC: 109 MMOL/L (ref 99–110)
CLARITY: CLEAR
CO2: 20 MMOL/L (ref 21–32)
COLOR: YELLOW
CREAT SERPL-MCNC: 1.5 MG/DL (ref 0.6–1.2)
EPITHELIAL CELLS, UA: 8 /HPF (ref 0–5)
GFR SERPL CREATININE-BSD FRML MDRD: 36 ML/MIN/{1.73_M2}
GLUCOSE BLD-MCNC: 103 MG/DL (ref 70–99)
GLUCOSE URINE: NEGATIVE MG/DL
HYALINE CASTS: 1 /LPF (ref 0–8)
KETONES, URINE: NEGATIVE MG/DL
LEUKOCYTE ESTERASE, URINE: ABNORMAL
MICROSCOPIC EXAMINATION: YES
NITRITE, URINE: NEGATIVE
PH UA: 6.5 (ref 5–8)
POTASSIUM SERPL-SCNC: 4.1 MMOL/L (ref 3.5–5.1)
PROTEIN UA: NEGATIVE MG/DL
RBC UA: 1 /HPF (ref 0–4)
SODIUM BLD-SCNC: 140 MMOL/L (ref 136–145)
SPECIFIC GRAVITY UA: 1.01 (ref 1–1.03)
T4 FREE: 2 NG/DL (ref 0.9–1.8)
TSH SERPL DL<=0.05 MIU/L-ACNC: 0.17 UIU/ML (ref 0.27–4.2)
URINE TYPE: ABNORMAL
UROBILINOGEN, URINE: 0.2 E.U./DL
WBC UA: 7 /HPF (ref 0–5)

## 2022-10-31 RX ORDER — LEVOTHYROXINE SODIUM 88 UG/1
88 TABLET ORAL DAILY
Qty: 30 TABLET | Refills: 2 | Status: SHIPPED | OUTPATIENT
Start: 2022-10-31

## 2022-11-28 RX ORDER — AMLODIPINE BESYLATE 2.5 MG/1
2.5 TABLET ORAL DAILY
Qty: 90 TABLET | Refills: 1 | Status: SHIPPED | OUTPATIENT
Start: 2022-11-28

## 2023-01-03 DIAGNOSIS — E05.90 HYPERTHYROIDISM: Primary | ICD-10-CM

## 2023-01-03 RX ORDER — LEVOTHYROXINE SODIUM 88 UG/1
88 TABLET ORAL DAILY
Qty: 30 TABLET | Refills: 1 | Status: SHIPPED | OUTPATIENT
Start: 2023-01-03

## 2023-01-03 RX ORDER — AMLODIPINE BESYLATE 2.5 MG/1
2.5 TABLET ORAL DAILY
Qty: 90 TABLET | Refills: 1 | Status: SHIPPED | OUTPATIENT
Start: 2023-01-03

## 2023-01-03 NOTE — TELEPHONE ENCOUNTER
----- Message from Vital Eurotridarwin sent at 1/3/2023 10:23 AM EST -----  Subject: Refill Request    QUESTIONS  Name of Medication? Other - amLODIPine (NORVASC) 2.5 MG tablet  Patient-reported dosage and instructions? 1 2.5mg once a day  How many days do you have left? 0  [facility] [id number]  Pharmacy phone number (if available)? [phone]  ---------------------------------------------------------------------------  --------------,  Name of Medication? Other - levothyroxine (SYNTHROID) 88 MCG tablet  Patient-reported dosage and instructions? 1 88mcg once a day  How many days do you have left? 0  Preferred Pharmacy? [facility]  [id number]  Pharmacy phone number (if available)? [phone]  Additional Information for Provider? needs about 2 month supply for the   levo  ---------------------------------------------------------------------------  --------------  CALL BACK INFO  What is the best way for the office to contact you? OK to leave message on   voicemail  Preferred Call Back Phone Number? [phone]  ---------------------------------------------------------------------------  --------------  SCRIPT ANSWERS  Relationship to Patient?  Self

## 2023-01-05 NOTE — TELEPHONE ENCOUNTER
Patient advised. She states she does not go out in February and she will have lab done in March before she sees you.

## 2023-02-16 ENCOUNTER — TELEPHONE (OUTPATIENT)
Dept: FAMILY MEDICINE CLINIC | Age: 78
End: 2023-02-16

## 2023-02-16 RX ORDER — LEVOTHYROXINE SODIUM 88 UG/1
88 TABLET ORAL DAILY
Qty: 40 TABLET | Refills: 0 | Status: SHIPPED | OUTPATIENT
Start: 2023-02-16

## 2023-02-16 NOTE — TELEPHONE ENCOUNTER
Ok done  Orders Placed This Encounter   Medications    levothyroxine (SYNTHROID) 88 MCG tablet     Sig: Take 1 tablet by mouth Daily     Dispense:  40 tablet     Refill:  0     New dose

## 2023-02-16 NOTE — TELEPHONE ENCOUNTER
----- Message from Emerita Arora sent at 2/16/2023  1:34 PM EST -----  Subject: Refill Request    QUESTIONS  Name of Medication? levothyroxine (SYNTHROID) 88 MCG tablet  Patient-reported dosage and instructions? once a day   How many days do you have left? 4  Preferred Pharmacy? [facility] [id number]  Pharmacy phone number (if available)? [phone]  Additional Information for Provider? pt asking for a total of 40 pills to   get thru till 25th of march  ---------------------------------------------------------------------------  --------------  [address]  What is the best way for the office to contact you? OK to leave message on   voicemail  Preferred Call Back Phone Number? [phone]  ---------------------------------------------------------------------------  --------------  SCRIPT ANSWERS  Relationship to Patient?  Self

## 2023-03-27 ENCOUNTER — TELEPHONE (OUTPATIENT)
Dept: FAMILY MEDICINE CLINIC | Age: 78
End: 2023-03-27

## 2023-03-27 RX ORDER — LEVOTHYROXINE SODIUM 88 UG/1
88 TABLET ORAL DAILY
Qty: 30 TABLET | Refills: 1 | Status: SHIPPED | OUTPATIENT
Start: 2023-03-27

## 2023-03-27 NOTE — TELEPHONE ENCOUNTER
----- Message from Naz Saldivar sent at 3/27/2023  1:23 PM EDT -----  Subject: Refill Request    QUESTIONS  Name of Medication? levothyroxine (SYNTHROID) 88 MCG tablet  Patient-reported dosage and instructions? 1daily  How many days do you have left? 3  Preferred Pharmacy? [facility] [id number]  Pharmacy phone number (if available)? [phone]  Additional Information for Provider? Pt having car trouble, can't come in   to appt tomorrow, will reschedule later, needs medication refilled as only   has 3 days left.   ---------------------------------------------------------------------------  --------------  CALL BACK INFO  What is the best way for the office to contact you? OK to leave message on   voicemail  Preferred Call Back Phone Number? [phone]  ---------------------------------------------------------------------------  --------------  SCRIPT ANSWERS  Relationship to Patient?  Self

## 2023-03-27 NOTE — TELEPHONE ENCOUNTER
Ok done    Orders Placed This Encounter   Medications    levothyroxine (SYNTHROID) 88 MCG tablet     Sig: Take 1 tablet by mouth Daily     Dispense:  30 tablet     Refill:  1

## 2023-03-30 ENCOUNTER — APPOINTMENT (OUTPATIENT)
Dept: GENERAL RADIOLOGY | Age: 78
End: 2023-03-30
Payer: MEDICARE

## 2023-03-30 ENCOUNTER — HOSPITAL ENCOUNTER (OUTPATIENT)
Age: 78
Setting detail: OBSERVATION
Discharge: HOME | End: 2023-03-31
Attending: EMERGENCY MEDICINE | Admitting: INTERNAL MEDICINE
Payer: MEDICARE

## 2023-03-30 DIAGNOSIS — N18.9 CHRONIC KIDNEY DISEASE, UNSPECIFIED CKD STAGE: ICD-10-CM

## 2023-03-30 DIAGNOSIS — R55 SYNCOPE AND COLLAPSE: Primary | ICD-10-CM

## 2023-03-30 PROBLEM — D64.9 ANEMIA: Status: ACTIVE | Noted: 2023-03-30

## 2023-03-30 LAB
ALBUMIN SERPL-MCNC: 3.3 G/DL (ref 3.4–5)
ALBUMIN/GLOB SERPL: 1.1 {RATIO} (ref 1.1–2.2)
ALP SERPL-CCNC: 89 U/L (ref 40–129)
ALT SERPL-CCNC: 14 U/L (ref 10–40)
ANION GAP SERPL CALCULATED.3IONS-SCNC: 11 MMOL/L (ref 3–16)
ANION GAP SERPL CALCULATED.3IONS-SCNC: 9 MMOL/L (ref 3–16)
AST SERPL-CCNC: 29 U/L (ref 15–37)
BASOPHILS # BLD: 0 K/UL (ref 0–0.2)
BASOPHILS NFR BLD: 0.5 %
BILIRUB SERPL-MCNC: 1.9 MG/DL (ref 0–1)
BUN SERPL-MCNC: 15 MG/DL (ref 7–20)
BUN SERPL-MCNC: 16 MG/DL (ref 7–20)
CALCIUM SERPL-MCNC: 9 MG/DL (ref 8.3–10.6)
CALCIUM SERPL-MCNC: 9 MG/DL (ref 8.3–10.6)
CHLORIDE SERPL-SCNC: 108 MMOL/L (ref 99–110)
CHLORIDE SERPL-SCNC: 110 MMOL/L (ref 99–110)
CHP ED QC CHECK: YES
CO2 SERPL-SCNC: 20 MMOL/L (ref 21–32)
CO2 SERPL-SCNC: 21 MMOL/L (ref 21–32)
CREAT SERPL-MCNC: 1.6 MG/DL (ref 0.6–1.2)
CREAT SERPL-MCNC: 1.7 MG/DL (ref 0.6–1.2)
DEPRECATED RDW RBC AUTO: 14.8 % (ref 12.4–15.4)
EKG ATRIAL RATE: 76 BPM
EKG DIAGNOSIS: NORMAL
EKG P AXIS: 52 DEGREES
EKG P-R INTERVAL: 176 MS
EKG Q-T INTERVAL: 448 MS
EKG QRS DURATION: 116 MS
EKG QTC CALCULATION (BAZETT): 504 MS
EKG R AXIS: -38 DEGREES
EKG T AXIS: 0 DEGREES
EKG VENTRICULAR RATE: 76 BPM
EOSINOPHIL # BLD: 0.1 K/UL (ref 0–0.6)
EOSINOPHIL NFR BLD: 2.1 %
GFR SERPLBLD CREATININE-BSD FMLA CKD-EPI: 31 ML/MIN/{1.73_M2}
GFR SERPLBLD CREATININE-BSD FMLA CKD-EPI: 33 ML/MIN/{1.73_M2}
GLUCOSE BLD-MCNC: 122 MG/DL
GLUCOSE BLD-MCNC: 122 MG/DL (ref 70–99)
GLUCOSE SERPL-MCNC: 127 MG/DL (ref 70–99)
GLUCOSE SERPL-MCNC: 131 MG/DL (ref 70–99)
HCT VFR BLD AUTO: 31.9 % (ref 36–48)
HGB BLD-MCNC: 11.1 G/DL (ref 12–16)
LYMPHOCYTES # BLD: 1.6 K/UL (ref 1–5.1)
LYMPHOCYTES NFR BLD: 24 %
MCH RBC QN AUTO: 32.7 PG (ref 26–34)
MCHC RBC AUTO-ENTMCNC: 34.9 G/DL (ref 31–36)
MCV RBC AUTO: 93.7 FL (ref 80–100)
MONOCYTES # BLD: 0.5 K/UL (ref 0–1.3)
MONOCYTES NFR BLD: 8.4 %
NEUTROPHILS # BLD: 4.2 K/UL (ref 1.7–7.7)
NEUTROPHILS NFR BLD: 65 %
NT-PROBNP SERPL-MCNC: 368 PG/ML (ref 0–449)
PERFORMED ON: ABNORMAL
PLATELET # BLD AUTO: 176 K/UL (ref 135–450)
PMV BLD AUTO: 6.6 FL (ref 5–10.5)
POTASSIUM SERPL-SCNC: 3.7 MMOL/L (ref 3.5–5.1)
POTASSIUM SERPL-SCNC: 4 MMOL/L (ref 3.5–5.1)
PROT SERPL-MCNC: 6.4 G/DL (ref 6.4–8.2)
RBC # BLD AUTO: 3.41 M/UL (ref 4–5.2)
SODIUM SERPL-SCNC: 138 MMOL/L (ref 136–145)
SODIUM SERPL-SCNC: 141 MMOL/L (ref 136–145)
T4 FREE SERPL-MCNC: 1.4 NG/DL (ref 0.9–1.8)
TROPONIN T SERPL-MCNC: <0.01 NG/ML
TROPONIN T SERPL-MCNC: <0.01 NG/ML
TSH SERPL DL<=0.005 MIU/L-ACNC: 4.23 UIU/ML (ref 0.27–4.2)
WBC # BLD AUTO: 6.5 K/UL (ref 4–11)

## 2023-03-30 PROCEDURE — 80053 COMPREHEN METABOLIC PANEL: CPT

## 2023-03-30 PROCEDURE — G0378 HOSPITAL OBSERVATION PER HR: HCPCS

## 2023-03-30 PROCEDURE — 93005 ELECTROCARDIOGRAM TRACING: CPT | Performed by: EMERGENCY MEDICINE

## 2023-03-30 PROCEDURE — 84443 ASSAY THYROID STIM HORMONE: CPT

## 2023-03-30 PROCEDURE — 85025 COMPLETE CBC W/AUTO DIFF WBC: CPT

## 2023-03-30 PROCEDURE — 93010 ELECTROCARDIOGRAM REPORT: CPT | Performed by: INTERNAL MEDICINE

## 2023-03-30 PROCEDURE — 6370000000 HC RX 637 (ALT 250 FOR IP): Performed by: INTERNAL MEDICINE

## 2023-03-30 PROCEDURE — 71110 X-RAY EXAM RIBS BIL 3 VIEWS: CPT

## 2023-03-30 PROCEDURE — 71045 X-RAY EXAM CHEST 1 VIEW: CPT

## 2023-03-30 PROCEDURE — 36415 COLL VENOUS BLD VENIPUNCTURE: CPT

## 2023-03-30 PROCEDURE — 2580000003 HC RX 258: Performed by: INTERNAL MEDICINE

## 2023-03-30 PROCEDURE — 84484 ASSAY OF TROPONIN QUANT: CPT

## 2023-03-30 PROCEDURE — 84439 ASSAY OF FREE THYROXINE: CPT

## 2023-03-30 PROCEDURE — 99285 EMERGENCY DEPT VISIT HI MDM: CPT

## 2023-03-30 PROCEDURE — 83880 ASSAY OF NATRIURETIC PEPTIDE: CPT

## 2023-03-30 RX ORDER — ACETAMINOPHEN 650 MG/1
650 SUPPOSITORY RECTAL EVERY 6 HOURS PRN
Status: DISCONTINUED | OUTPATIENT
Start: 2023-03-30 | End: 2023-03-31 | Stop reason: HOSPADM

## 2023-03-30 RX ORDER — VITAMIN B COMPLEX
1 CAPSULE ORAL DAILY
Status: DISCONTINUED | OUTPATIENT
Start: 2023-03-31 | End: 2023-03-31 | Stop reason: HOSPADM

## 2023-03-30 RX ORDER — ONDANSETRON 4 MG/1
4 TABLET, ORALLY DISINTEGRATING ORAL EVERY 8 HOURS PRN
Status: DISCONTINUED | OUTPATIENT
Start: 2023-03-30 | End: 2023-03-31 | Stop reason: HOSPADM

## 2023-03-30 RX ORDER — SODIUM CHLORIDE 0.9 % (FLUSH) 0.9 %
5-40 SYRINGE (ML) INJECTION PRN
Status: DISCONTINUED | OUTPATIENT
Start: 2023-03-30 | End: 2023-03-31 | Stop reason: HOSPADM

## 2023-03-30 RX ORDER — ENOXAPARIN SODIUM 100 MG/ML
30 INJECTION SUBCUTANEOUS DAILY
Status: DISCONTINUED | OUTPATIENT
Start: 2023-03-31 | End: 2023-03-31 | Stop reason: HOSPADM

## 2023-03-30 RX ORDER — ONDANSETRON 2 MG/ML
4 INJECTION INTRAMUSCULAR; INTRAVENOUS EVERY 6 HOURS PRN
Status: DISCONTINUED | OUTPATIENT
Start: 2023-03-30 | End: 2023-03-31 | Stop reason: HOSPADM

## 2023-03-30 RX ORDER — DIPHENHYDRAMINE HCL 25 MG
25 TABLET ORAL EVERY 6 HOURS PRN
COMMUNITY

## 2023-03-30 RX ORDER — SODIUM CHLORIDE 9 MG/ML
INJECTION, SOLUTION INTRAVENOUS PRN
Status: DISCONTINUED | OUTPATIENT
Start: 2023-03-30 | End: 2023-03-31 | Stop reason: HOSPADM

## 2023-03-30 RX ORDER — ACETAMINOPHEN 325 MG/1
650 TABLET ORAL EVERY 6 HOURS PRN
Status: DISCONTINUED | OUTPATIENT
Start: 2023-03-30 | End: 2023-03-31 | Stop reason: HOSPADM

## 2023-03-30 RX ORDER — LOPERAMIDE HYDROCHLORIDE 2 MG/1
2 CAPSULE ORAL 4 TIMES DAILY PRN
COMMUNITY

## 2023-03-30 RX ORDER — SODIUM CHLORIDE 0.9 % (FLUSH) 0.9 %
5-40 SYRINGE (ML) INJECTION EVERY 12 HOURS SCHEDULED
Status: DISCONTINUED | OUTPATIENT
Start: 2023-03-30 | End: 2023-03-31 | Stop reason: HOSPADM

## 2023-03-30 RX ORDER — GUAIFENESIN 600 MG/1
600 TABLET, EXTENDED RELEASE ORAL 2 TIMES DAILY
Status: DISCONTINUED | OUTPATIENT
Start: 2023-03-30 | End: 2023-03-31 | Stop reason: HOSPADM

## 2023-03-30 RX ORDER — POLYETHYLENE GLYCOL 3350 17 G/17G
17 POWDER, FOR SOLUTION ORAL DAILY PRN
Status: DISCONTINUED | OUTPATIENT
Start: 2023-03-30 | End: 2023-03-31 | Stop reason: HOSPADM

## 2023-03-30 RX ORDER — LEVOTHYROXINE SODIUM 88 UG/1
88 TABLET ORAL DAILY
Status: DISCONTINUED | OUTPATIENT
Start: 2023-03-31 | End: 2023-03-31 | Stop reason: HOSPADM

## 2023-03-30 RX ADMIN — SODIUM CHLORIDE, PRESERVATIVE FREE 10 ML: 5 INJECTION INTRAVENOUS at 22:04

## 2023-03-30 RX ADMIN — GUAIFENESIN 600 MG: 600 TABLET ORAL at 22:03

## 2023-03-30 RX ADMIN — ACETAMINOPHEN 650 MG: 325 TABLET ORAL at 18:39

## 2023-03-30 ASSESSMENT — LIFESTYLE VARIABLES
HOW MANY STANDARD DRINKS CONTAINING ALCOHOL DO YOU HAVE ON A TYPICAL DAY: PATIENT DOES NOT DRINK
HOW OFTEN DO YOU HAVE A DRINK CONTAINING ALCOHOL: NEVER

## 2023-03-30 ASSESSMENT — PAIN DESCRIPTION - DESCRIPTORS: DESCRIPTORS: TENDER

## 2023-03-30 ASSESSMENT — PAIN SCALES - GENERAL
PAINLEVEL_OUTOF10: 6
PAINLEVEL_OUTOF10: 4
PAINLEVEL_OUTOF10: 4

## 2023-03-30 ASSESSMENT — PAIN DESCRIPTION - LOCATION: LOCATION: CHEST

## 2023-03-30 ASSESSMENT — PAIN - FUNCTIONAL ASSESSMENT: PAIN_FUNCTIONAL_ASSESSMENT: 0-10

## 2023-03-30 ASSESSMENT — PAIN DESCRIPTION - ORIENTATION: ORIENTATION: LEFT

## 2023-03-30 NOTE — ED TRIAGE NOTES
Patient arrived to the ED via EMS w/ complaints of Syncope episode while waiting on medications at pharmacy started feeling light headed, denies chest pain at that time but felt tired because she takes naps around 1400 daily and woke up and a bystander was preforming CPR;     EMS was called for cardiac arrest; when EMS arrived patient was alert and oriented and talking; did not give a time on how long CPR was on going;  They place 20g Right AC, gave 4mg of zofran IV Push and 324mg PO ASA en route to hospital.     Patient is A&O x4; states pain is 4/10 in chest area when touched; unsure what happened; VSS;     EDMD at bedside assessing patient w/ this RN; will continue to monitor throughout stay in ED

## 2023-03-30 NOTE — ED PROVIDER NOTES
during the hospital encounter of 03/30/23   CBC with Auto Differential   Result Value Ref Range    WBC 6.5 4.0 - 11.0 K/uL    RBC 3.41 (L) 4.00 - 5.20 M/uL    Hemoglobin 11.1 (L) 12.0 - 16.0 g/dL    Hematocrit 31.9 (L) 36.0 - 48.0 %    MCV 93.7 80.0 - 100.0 fL    MCH 32.7 26.0 - 34.0 pg    MCHC 34.9 31.0 - 36.0 g/dL    RDW 14.8 12.4 - 15.4 %    Platelets 371 346 - 496 K/uL    MPV 6.6 5.0 - 10.5 fL    Neutrophils % 65.0 %    Lymphocytes % 24.0 %    Monocytes % 8.4 %    Eosinophils % 2.1 %    Basophils % 0.5 %    Neutrophils Absolute 4.2 1.7 - 7.7 K/uL    Lymphocytes Absolute 1.6 1.0 - 5.1 K/uL    Monocytes Absolute 0.5 0.0 - 1.3 K/uL    Eosinophils Absolute 0.1 0.0 - 0.6 K/uL    Basophils Absolute 0.0 0.0 - 0.2 K/uL   Basic Metabolic Panel w/ Reflex to MG   Result Value Ref Range    Sodium 138 136 - 145 mmol/L    Potassium reflex Magnesium 3.7 3.5 - 5.1 mmol/L    Chloride 108 99 - 110 mmol/L    CO2 21 21 - 32 mmol/L    Anion Gap 9 3 - 16    Glucose 127 (H) 70 - 99 mg/dL    BUN 15 7 - 20 mg/dL    Creatinine 1.6 (H) 0.6 - 1.2 mg/dL    Est, Glom Filt Rate 33 (A) >60    Calcium 9.0 8.3 - 10.6 mg/dL   Comprehensive Metabolic Panel w/ Reflex to MG   Result Value Ref Range    Sodium 141 136 - 145 mmol/L    Potassium reflex Magnesium 4.0 3.5 - 5.1 mmol/L    Chloride 110 99 - 110 mmol/L    CO2 20 (L) 21 - 32 mmol/L    Anion Gap 11 3 - 16    Glucose 131 (H) 70 - 99 mg/dL    BUN 16 7 - 20 mg/dL    Creatinine 1.7 (H) 0.6 - 1.2 mg/dL    Est, Glom Filt Rate 31 (A) >60    Calcium 9.0 8.3 - 10.6 mg/dL    Total Protein 6.4 6.4 - 8.2 g/dL    Albumin 3.3 (L) 3.4 - 5.0 g/dL    Albumin/Globulin Ratio 1.1 1.1 - 2.2    Total Bilirubin 1.9 (H) 0.0 - 1.0 mg/dL    Alkaline Phosphatase 89 40 - 129 U/L    ALT 14 10 - 40 U/L    AST 29 15 - 37 U/L   Troponin   Result Value Ref Range    Troponin <0.01 <0.01 ng/mL   Brain Natriuretic Peptide   Result Value Ref Range    Pro- 0 - 449 pg/mL   TSH with Reflex   Result Value Ref Range    TSH 4.23 stable mild anemia. Admit to hospital medicine for further evaluation and treatment of what is suspected to be a syncopal episode with lower suspicion for cardiac arrest. D/w Dr. Wil Stapleton who will admit.     - History obtained from:  patient and EMS  - Records reviewed: Office visit 10/20/2022 (Dr. Aimee Macias) reveals history of primary hypertension, hypothyroidism, renal insufficiency. - Consults:   IP CONSULT TO HOSPITALIST      Is this patient to be included in the SEP-1 Core Measure? No   Exclusion criteria - the patient is NOT to be included for SEP-1 Core Measure due to: Infection is not suspected    I, Robert Weiss MD, am the primary clinician of record. During the patient's ED course, the patient was given:  Medications   acetaminophen (TYLENOL) tablet 650 mg (650 mg Oral Given 3/30/23 1839)     Or   acetaminophen (TYLENOL) suppository 650 mg ( Rectal See Alternative 3/30/23 1839)        Patient was given prescriptions for the following medications. I counseled the patient as to how to take these medications. Current Discharge Medication List          Patient instructed to follow up with:   No follow-up provider specified. All questions were answered and the patient/family expressed understanding and agreement with the plan. PROCEDURES  None    CRITICAL CARE  N/A    CLINICAL IMPRESSION  1. Syncope and collapse    2. Chronic kidney disease, unspecified CKD stage        DISPOSITION  Admitted 03/30/2023 05:25:04 PM     Robert Weiss MD    Note: This chart was created using voice recognition dictation software. Efforts were made by me to ensure accuracy, however some errors may be present due to limitations of this technology and occasionally words are not transcribed correctly.         Robert Weiss MD  03/30/23 2000

## 2023-03-30 NOTE — ED NOTES
Handoff report given to [facility]; all questions and concerns answered; receiving RN acknowledged had no further questions at this time and is now assuming all care of this patient ;       Junior Marquis RN  03/30/23 5561

## 2023-03-30 NOTE — ED NOTES
ED SBAR report provider to [facility]. Patient to be transported to Room 4251 via stretcher by transport tech. Patient transported with bedside cardiac monitor. IV site clean, dry, and intact. MEWS score and pain assessed  and documented. Updated patient and family on plan of care.      Benjamin Scott RN  03/30/23 0126

## 2023-03-30 NOTE — PROGRESS NOTES
Medication Reconciliation    List of medications patient is currently taking is complete. Source of information: 1. Conversation with patient at bedside                                      2. EPIC records      Allergies  Barium iodide and Penicillins     Notes regarding home medications:   1. Patient reports that levothyroxine is the only prescription medication she takes and she had it this morning.       Hugo Whalen [facility]   3/30/2023  5:11 PM

## 2023-03-30 NOTE — H&P
Hospital Medicine History & Physical      PCP: Camacho Marie MD    Date of Admission: 3/30/2023    Date of Service: Pt seen/examined on 03/30/2023 and Admitted to Inpatient with expected LOS greater than two midnights due to medical therapy. Chief Complaint:  Rib pain syncope      History Of Present Illness:      69 y.o. female who presented to [facility] via EMS after she had a syncopal episode, patient had her ears wax taking care off today and she was walking and does remember did not have any chest pain shortness of breath nausea vomiting or palpitation before, apparently bystander CPR was performed I doubt any cardiac arrest, patient being transferred to emergency department she had rib pain with deep breath and body movement, denies blurry or double vision denies shortness of breath no headache no neck pain no abdominal pain nausea vomiting or palpitation. Did not have any syncopal episodes in the past, patient being admitted for further management and treatment    Past Medical History:          Diagnosis Date    Vertigo        Past Surgical History:      History reviewed. No pertinent surgical history. Medications Prior to Admission:      Prior to Admission medications    Medication Sig Start Date End Date Taking?  Authorizing Provider   loperamide (IMODIUM) 2 MG capsule Take 2 mg by mouth 4 times daily as needed for Diarrhea   Yes Historical Provider, MD   diphenhydrAMINE (BENADRYL) 25 MG tablet Take 25 mg by mouth every 6 hours as needed for Itching   Yes Historical Provider, MD   levothyroxine (SYNTHROID) 88 MCG tablet Take 1 tablet by mouth Daily 3/27/23   Camacho Marie MD   b complex vitamins capsule Take 1 capsule by mouth daily    Historical Provider, MD   vitamin D (CHOLECALCIFEROL) 1000 UNIT TABS tablet Take 1,000 Units by mouth daily    Historical Provider, MD   Multiple Vitamins-Minerals (THERAPEUTIC MULTIVITAMIN-MINERALS) tablet Take 1 tablet by mouth daily    Historical Provider, MD vitamin C (ASCORBIC ACID) 500 MG tablet Take 500 mg by mouth daily    Historical Provider, MD       Allergies:  Barium iodide and Penicillins    Social History:      The patient currently lives at home    TOBACCO:   reports that she has never smoked. She has never used smokeless tobacco.  ETOH:   reports no history of alcohol use. E-cigarette/Vaping       Questions Responses    E-cigarette/Vaping Use     Start Date     Passive Exposure     Quit Date     Counseling Given     Comments               Family History:      Reviewed and negative in regards to presenting illness/complaint. History reviewed. No pertinent family history. REVIEW OF SYSTEMS COMPLETED:   Pertinent positives as noted in the HPI. All other systems reviewed and negative. PHYSICAL EXAM PERFORMED:    BP (!) 156/71   Pulse 72   Temp 98.2 °F (36.8 °C) (Oral)   Resp 16   Ht 5' 2\" (1.575 m)   Wt 156 lb 1.4 oz (70.8 kg)   SpO2 97%   BMI 28.55 kg/m²     General appearance:  No apparent distress  HEENT:  Normal cephalic  Neck: Supple  Respiratory:  Normal respiratory effort. Clear to auscultation, bilaterally without Rales/Wheezes/Rhonchi. Cardiovascular:  Regular rate and rhythm with normal S1/S2 without murmurs, rubs or gallops. Abdomen: Soft, non-tender, non-distended   Musculoskeletal:  No clubbing, cyanosis  Skin: Skin color, texture, turgor normal.  No rashes or lesions. Neurologic: No focal weakness  Psychiatric:  Alert and oriented  Capillary Refill: Brisk,3 seconds, normal  Peripheral Pulses: +2 palpable, equal bilaterally       Labs:     Recent Labs     03/30/23  1404   WBC 6.5   HGB 11.1*   HCT 31.9*        Recent Labs     03/30/23  1404     138   K 4.0  3.7     108   CO2 20*  21   BUN 16  15   CREATININE 1.7*  1.6*   CALCIUM 9.0  9.0     Recent Labs     03/30/23  1404   AST 29   ALT 14   BILITOT 1.9*   ALKPHOS 89     No results for input(s): INR in the last 72 hours.   Recent Labs     03/30/23  1404

## 2023-03-31 VITALS
OXYGEN SATURATION: 96 % | TEMPERATURE: 97.9 F | BODY MASS INDEX: 28.72 KG/M2 | WEIGHT: 156.09 LBS | HEIGHT: 62 IN | HEART RATE: 92 BPM | SYSTOLIC BLOOD PRESSURE: 169 MMHG | DIASTOLIC BLOOD PRESSURE: 84 MMHG | RESPIRATION RATE: 16 BRPM

## 2023-03-31 PROBLEM — R07.89 NON-CARDIAC CHEST PAIN: Status: ACTIVE | Noted: 2023-03-31

## 2023-03-31 PROBLEM — I10 ESSENTIAL HYPERTENSION: Status: ACTIVE | Noted: 2023-03-31

## 2023-03-31 LAB
ALBUMIN SERPL-MCNC: 3.3 G/DL (ref 3.4–5)
ALBUMIN/GLOB SERPL: 1.1 {RATIO} (ref 1.1–2.2)
ALP SERPL-CCNC: 73 U/L (ref 40–129)
ALT SERPL-CCNC: 14 U/L (ref 10–40)
ANION GAP SERPL CALCULATED.3IONS-SCNC: 12 MMOL/L (ref 3–16)
AST SERPL-CCNC: 26 U/L (ref 15–37)
BASOPHILS # BLD: 0 K/UL (ref 0–0.2)
BASOPHILS NFR BLD: 0.6 %
BILIRUB SERPL-MCNC: 2.3 MG/DL (ref 0–1)
BUN SERPL-MCNC: 16 MG/DL (ref 7–20)
CALCIUM SERPL-MCNC: 9 MG/DL (ref 8.3–10.6)
CHLORIDE SERPL-SCNC: 109 MMOL/L (ref 99–110)
CO2 SERPL-SCNC: 18 MMOL/L (ref 21–32)
CREAT SERPL-MCNC: 1.6 MG/DL (ref 0.6–1.2)
DEPRECATED RDW RBC AUTO: 14.6 % (ref 12.4–15.4)
EOSINOPHIL # BLD: 0.2 K/UL (ref 0–0.6)
EOSINOPHIL NFR BLD: 3.6 %
GFR SERPLBLD CREATININE-BSD FMLA CKD-EPI: 33 ML/MIN/{1.73_M2}
GLUCOSE SERPL-MCNC: 98 MG/DL (ref 70–99)
HCT VFR BLD AUTO: 34.1 % (ref 36–48)
HGB BLD-MCNC: 11.7 G/DL (ref 12–16)
LV EF: 63 %
LVEF MODALITY: NORMAL
LYMPHOCYTES # BLD: 1 K/UL (ref 1–5.1)
LYMPHOCYTES NFR BLD: 23.5 %
MCH RBC QN AUTO: 32 PG (ref 26–34)
MCHC RBC AUTO-ENTMCNC: 34.3 G/DL (ref 31–36)
MCV RBC AUTO: 93 FL (ref 80–100)
MONOCYTES # BLD: 0.5 K/UL (ref 0–1.3)
MONOCYTES NFR BLD: 11 %
NEUTROPHILS # BLD: 2.7 K/UL (ref 1.7–7.7)
NEUTROPHILS NFR BLD: 61.3 %
PLATELET # BLD AUTO: 150 K/UL (ref 135–450)
PMV BLD AUTO: 6.5 FL (ref 5–10.5)
POTASSIUM SERPL-SCNC: 3.8 MMOL/L (ref 3.5–5.1)
PROT SERPL-MCNC: 6.4 G/DL (ref 6.4–8.2)
RBC # BLD AUTO: 3.66 M/UL (ref 4–5.2)
SODIUM SERPL-SCNC: 139 MMOL/L (ref 136–145)
WBC # BLD AUTO: 4.4 K/UL (ref 4–11)

## 2023-03-31 PROCEDURE — 2580000003 HC RX 258: Performed by: INTERNAL MEDICINE

## 2023-03-31 PROCEDURE — 99205 OFFICE O/P NEW HI 60 MIN: CPT | Performed by: INTERNAL MEDICINE

## 2023-03-31 PROCEDURE — 6370000000 HC RX 637 (ALT 250 FOR IP): Performed by: INTERNAL MEDICINE

## 2023-03-31 PROCEDURE — 80053 COMPREHEN METABOLIC PANEL: CPT

## 2023-03-31 PROCEDURE — G0378 HOSPITAL OBSERVATION PER HR: HCPCS

## 2023-03-31 PROCEDURE — 6360000002 HC RX W HCPCS: Performed by: INTERNAL MEDICINE

## 2023-03-31 PROCEDURE — 93306 TTE W/DOPPLER COMPLETE: CPT

## 2023-03-31 PROCEDURE — 85025 COMPLETE CBC W/AUTO DIFF WBC: CPT

## 2023-03-31 PROCEDURE — 6370000000 HC RX 637 (ALT 250 FOR IP): Performed by: STUDENT IN AN ORGANIZED HEALTH CARE EDUCATION/TRAINING PROGRAM

## 2023-03-31 PROCEDURE — 36415 COLL VENOUS BLD VENIPUNCTURE: CPT

## 2023-03-31 RX ORDER — LOPERAMIDE HYDROCHLORIDE 2 MG/1
2 CAPSULE ORAL 4 TIMES DAILY PRN
Status: DISCONTINUED | OUTPATIENT
Start: 2023-03-31 | End: 2023-03-31 | Stop reason: HOSPADM

## 2023-03-31 RX ADMIN — LEVOTHYROXINE SODIUM 88 MCG: 0.09 TABLET ORAL at 05:08

## 2023-03-31 RX ADMIN — Medication 1 CAPSULE: at 08:39

## 2023-03-31 RX ADMIN — SODIUM CHLORIDE, PRESERVATIVE FREE 10 ML: 5 INJECTION INTRAVENOUS at 08:43

## 2023-03-31 RX ADMIN — LOPERAMIDE HYDROCHLORIDE 2 MG: 2 CAPSULE ORAL at 10:27

## 2023-03-31 RX ADMIN — GUAIFENESIN 600 MG: 600 TABLET ORAL at 08:39

## 2023-03-31 ASSESSMENT — PAIN SCALES - GENERAL: PAINLEVEL_OUTOF10: 0

## 2023-03-31 NOTE — DISCHARGE SUMMARY
Hospital Medicine Discharge Summary    Patient ID: Erick [place]      Patient's PCP: Felicitas Sibley MD    Admit Date: 3/30/2023     Discharge Date:   03/31/23      Admitting Provider: Elisabeth Serrano MD     Discharge Provider: Ryan Tejada MD     Discharge Diagnoses: Active Hospital Problems    Diagnosis     Essential hypertension [I10]     Non-cardiac chest pain [R07.89]     Syncope and collapse [R55]     CKD (chronic kidney disease) [N18.9]     Anemia [D64.9]     Hypothyroidism (acquired) [E03.9]        The patient was seen and examined on day of discharge and this discharge summary is in conjunction with any daily progress note from day of discharge. Hospital Course:   69 y.o. female who presented to [facility] via EMS after she had a syncopal episode, patient had her ears wax taking care off today and she was walking and does remember did not have any chest pain shortness of breath nausea vomiting or palpitation before, apparently bystander CPR was performed I doubt any cardiac arrest, patient being transferred to emergency department she had rib pain with deep breath and body movement, denies blurry or double vision denies shortness of breath no headache no neck pain no abdominal pain nausea vomiting or palpitation. Did not have any syncopal episodes in the past, patient being admitted for further management and treatment     Syncope and collapse, appears vasovagal, I cannot exclude other etiology, to note that there was CPR performed by bystanders, I doubt any cardiac arrest, telemetry monitoring, cardiology patient seen by cardiology, echocardiogram with mild aortic stenosis. Cleared for discharge with 30-day event monitor and follow-up with cardiology.    CKD, monitor closely   Anemia with no obvious bleeding follow-up as outpatient for further work-up   Hypothyroidism on replacement   Chest wall pain due to CPR in any case cardiology consulted and troponin repeated

## 2023-03-31 NOTE — ACP (ADVANCE CARE PLANNING)
Advance Care Planning     Advance Care Planning Activator (Inpatient)  Conversation Note      Date of ACP Conversation: 3/31/2023     Conversation Conducted with: Patient with Decision Making Capacity    ACP Activator: Kayleen Ca RN    Health Care Decision Maker:     Current Designated Health Care Decision Maker:     Primary Decision Maker: William Larios - Other - [phone]    Today we documented desired Decision Maker(s), who is (are) NOT Legal Next of [facility]. ACP documents are required for decision maker authority. Care Preferences    Ventilation: \"If you were in your present state of health and suddenly became very ill and were unable to breathe on your own, what would your preference be about the use of a ventilator (breathing machine) if it were available to you? \"      Would the patient desire the use of ventilator (breathing machine)?: no    \"If your health worsens and it becomes clear that your chance of recovery is unlikely, what would your preference be about the use of a ventilator (breathing machine) if it were available to you? \"     Would the patient desire the use of ventilator (breathing machine)?: No      Resuscitation  \"CPR works best to restart the heart when there is a sudden event, like a heart attack, in someone who is otherwise healthy. Unfortunately, CPR does not typically restart the heart for people who have serious health conditions or who are very sick. \"    \"In the event your heart stopped as a result of an underlying serious health condition, would you want attempts to be made to restart your heart (answer \"yes\" for attempt to resuscitate) or would you prefer a natural death (answer \"no\" for do not attempt to resuscitate)? \" no       [] Yes   [x] No   Educated Patient / Sean Gomez regarding differences between Advance Directives and portable DNR orders.     Length of ACP Conversation in minutes: 4 min     Conversation Outcomes:  ACP discussion completed    Follow-up plan:    [] Schedule follow-up conversation to continue planning  [] Referred individual to Provider for additional questions/concerns   [] Advised patient/agent/surrogate to review completed ACP document and update if needed with changes in condition, patient preferences or care setting    [x] This note routed to one or more involved healthcare providers        Aysha Muñoz RN, BSN,   [phone]  Electronically signed by Aysha Muñoz RN on 3/31/2023 at 12:40 PM

## 2023-03-31 NOTE — PROGRESS NOTES
Attempted to call patient room about 30 day monitor, no answer. RN spoke to patient and states patient declines 30 day monitor. Will make MD aware.     Per John MSN, RN [facility]   [phone]

## 2023-03-31 NOTE — CONSULTS
Referring Physician: Dr. Cande Reynoso  Reason for Consultation: \"Syncope, chest pain\"  Chief Complaint: I really do not know what happened. Subjective:   History of Present Illness: Devan Solomon is a 69 y.o. patient who presented to the hospital with complaints of possible syncope. She states that she had cerumen removed from her ears and was feeling well. She went to the pharmacy to  her Synthroid prescription and sat down to wait. The next thing she recalls is awaking to EMS performing CPR. She currently complains of musculoskeletal chest pain but otherwise feels in her usual state of health. She does not recall any specific incident prior to the event such as lightheadedness, chest pain, diaphoresis, or nausea. She states that she was feeling very tired when she sat down and she usually takes a nap around that time. She guesses it is plausible that she fell asleep although she feels like it would be unlikely that someone could not wake her up. She feels that multiple social stressors contributed. She recently lost her dogs and horse then her car broke down. Past Medical History:   has a past medical history of Vertigo. Surgical History:  Denies prior cardiac surgery. Social History:   reports that she has never smoked. She has never used smokeless tobacco. She reports that she does not drink alcohol and does not use drugs. Family History:  Denies premature coronary atherosclerosis. Home Medications:  Were reviewed and are listed in nursing record and/or below  Prior to Admission medications    Medication Sig Start Date End Date Taking?  Authorizing Provider   loperamide (IMODIUM) 2 MG capsule Take 2 mg by mouth 4 times daily as needed for Diarrhea   Yes Historical Provider, MD   diphenhydrAMINE (BENADRYL) 25 MG tablet Take 25 mg by mouth every 6 hours as needed for Itching   Yes Historical Provider, MD   levothyroxine (SYNTHROID) 88 MCG tablet Take 1 tablet by mouth Daily strength, numbness or tingling. Psychiatric: No anxiety or depression. Endocrine: No temperature intolerance. No excessive thirst, fluid intake, or urination. No tremor. Hematologic/Lymphatic: No abnormal bruising or bleeding, blood clots or swollen lymph nodes. Allergic/Immunologic: No nasal congestion or hives. Objective:   PHYSICAL EXAM:    Vitals:    03/31/23 0739   BP: (!) 167/84   Pulse: 79   Resp: 18   Temp: 97.8 °F (36.6 °C)   SpO2: 95%    Weight: 156 lb 1.4 oz (70.8 kg)       General Appearance:  Alert, cooperative, no distress, appears stated age. Head:  Normocephalic, without obvious abnormality, atraumatic. Eyes:  Pupils equal and round. No scleral icterus. Mouth: Moist mucosa, no pharyngeal erythema. Nose: Nares normal. No drainage or sinus tenderness. Neck: Supple, symmetrical, trachea midline. No adenopathy. No tenderness/mass/nodules. No carotid bruit or elevated JVD. Lungs:   Clear to auscultation bilaterally, respirations unlabored. No wheeze, rales, or rhonchi. Chest Wall:  + Chest wall tenderness. No deformity. Heart:  Regular rate. S1/S2 audible. II/VI AVINASH. Abdomen:   Soft, non-tender, bowel sounds active. Musculoskeletal: No muscle wasting or digital clubbing. Extremities: Extremities normal, atraumatic. No cyanosis or edema. Pulses: 2+ radial and carotid pulses, symmetric. Skin: No rashes or lesions. Pysch: Normal mood and affect. Alert and oriented x 4.    Neurologic: Normal gross motor and sensory exam.       Labs     CBC:   Lab Results   Component Value Date/Time    WBC 4.4 03/31/2023 06:33 AM    RBC 3.66 03/31/2023 06:33 AM    HGB 11.7 03/31/2023 06:33 AM    HCT 34.1 03/31/2023 06:33 AM    MCV 93.0 03/31/2023 06:33 AM    RDW 14.6 03/31/2023 06:33 AM     03/31/2023 06:33 AM     CMP:  Lab Results   Component Value Date/Time     03/31/2023 06:33 AM    K 3.8 03/31/2023 06:33 AM     03/31/2023 06:33 AM    CO2 18 03/31/2023 06:33 AM    BUN

## 2023-03-31 NOTE — PROGRESS NOTES
Discharge instructions discussed with Pt. IV removed without complications. Dry dressing applied. Tele monitor removed. Pt waiting on ride to arrive.

## 2023-03-31 NOTE — ACP (ADVANCE CARE PLANNING)
Advance Care Planning     Advance Care Planning Inpatient Note  [facility] Department    Today's Date: 3/31/2023  Unit: [facility] 4N MED SURG    Received request from IDT Member. Upon review of chart and communication with care team, patient's decision making abilities are not in question. . Patient was/were present in the room during visit. Goals of ACP Conversation:  Discuss advance care planning documents    Health Care Decision Makers:       Primary Decision Maker: Nilsa Olivares - [phone]  Summary:  Verified Healthcare Decision [facility]    Advance Care Planning Documents (Patient Wishes):  Living Will/Advance Directive     Assessment:  Patient dressed and waiting for ride to go home. Patient appears very anxious to leave. She has living will in her paper chart and says she has healthcare power of  at home, naming Lexi Handley as [address] [place] [address]. She also requested the DNR form.  answered questions.     Interventions:  Requested patient/family to submit existing document for our records: Living Will/Advance Directive  Provided education on documents for clarity and greater understanding    Care Preferences Communicated:   No    Outcomes/Plan:  ACP Discussion: Completed    Electronically signed by Elver Argueta [facility] on 3/31/2023 at 2:31 PM

## 2023-03-31 NOTE — PLAN OF CARE
Problem: Discharge Planning  Goal: Discharge to home or other facility with appropriate resources  3/31/2023 1432 by Cathy Banks RN  Outcome: Completed  3/31/2023 0220 by Bijan Pagan RN  Outcome: Progressing  Flowsheets (Taken 3/31/2023 0219)  Discharge to home or other facility with appropriate resources:   Identify barriers to discharge with patient and caregiver   Identify discharge learning needs (meds, wound care, etc)   Refer to discharge planning if patient needs post-hospital services based on physician order or complex needs related to functional status, cognitive ability or social support system   Arrange for needed discharge resources and transportation as appropriate     Problem: Pain  Goal: Verbalizes/displays adequate comfort level or baseline comfort level  3/31/2023 1432 by Cathy Banks RN  Outcome: Completed  3/31/2023 0220 by Bijan Pagan RN  Outcome: Progressing     Problem: Metabolic/Fluid and Electrolytes - Adult  Goal: Electrolytes maintained within normal limits  3/31/2023 1432 by Cathy Banks RN  Outcome: Completed  3/31/2023 0220 by Bijan Pagan RN  Outcome: Progressing     Problem: Safety - Adult  Goal: Free from fall injury  3/31/2023 1432 by Cathy Banks RN  Outcome: Completed  3/31/2023 0220 by Bijan Pagan RN  Outcome: Progressing

## 2023-03-31 NOTE — PLAN OF CARE
Problem: Discharge Planning  Goal: Discharge to home or other facility with appropriate resources  Outcome: Progressing  Flowsheets (Taken 3/31/2023 0219)  Discharge to home or other facility with appropriate resources:   Identify barriers to discharge with patient and caregiver   Identify discharge learning needs (meds, wound care, etc)   Refer to discharge planning if patient needs post-hospital services based on physician order or complex needs related to functional status, cognitive ability or social support system   Arrange for needed discharge resources and transportation as appropriate     Problem: Pain  Goal: Verbalizes/displays adequate comfort level or baseline comfort level  Outcome: Progressing     Problem: Metabolic/Fluid and Electrolytes - Adult  Goal: Electrolytes maintained within normal limits  Outcome: Progressing     Problem: Safety - Adult  Goal: Free from fall injury  Outcome: Progressing

## 2023-03-31 NOTE — PROGRESS NOTES
Pt arrived to room 4251. Observed pt ambulate from stretcher to BR then returned to bed. Gait steady. Pt A&Ox4.     Electronically signed by Geovany Keith RN on 3/30/2023 at 8:05 PM

## 2023-04-03 ENCOUNTER — TELEPHONE (OUTPATIENT)
Dept: FAMILY MEDICINE CLINIC | Age: 78
End: 2023-04-03

## 2023-04-03 NOTE — TELEPHONE ENCOUNTER
Care Transitions Initial Follow Up Call    Outreach made within 2 business days of discharge: Yes    Patient: [id number] Patient : 1945   MRN: [id number]  Reason for Admission: There are no discharge diagnoses documented for the most recent discharge. Discharge Date: 3/31/23       Spoke with: 4-3-2023 - Left msg for Larry to return call. Discharge department/facility: [facility] Interactive Patient Contact:  Was patient able to fill all prescriptions: N/A  Was patient instructed to bring all medications to the follow-up visit: Yes  Is patient taking all medications as directed in the discharge summary?  N/A  Does patient understand their discharge instructions:YES   Does patient have questions or concerns that need addressed prior to 7-14 day follow up office visit:     Scheduled appointment with PCP within 7-14 days    Follow Up  Future Appointments   Date Time Provider Catrachita Hernandez   2023 11:40 AM Allyson Pena MD [address] [place] [address]

## 2023-04-04 ENCOUNTER — OFFICE VISIT (OUTPATIENT)
Dept: FAMILY MEDICINE CLINIC | Age: 78
End: 2023-04-04

## 2023-04-04 VITALS
DIASTOLIC BLOOD PRESSURE: 88 MMHG | BODY MASS INDEX: 28.34 KG/M2 | WEIGHT: 154 LBS | HEIGHT: 62 IN | SYSTOLIC BLOOD PRESSURE: 136 MMHG | TEMPERATURE: 98 F

## 2023-04-04 DIAGNOSIS — R07.89 NON-CARDIAC CHEST PAIN: ICD-10-CM

## 2023-04-04 DIAGNOSIS — Z09 HOSPITAL DISCHARGE FOLLOW-UP: Primary | ICD-10-CM

## 2023-04-04 DIAGNOSIS — R55 SYNCOPE, UNSPECIFIED SYNCOPE TYPE: ICD-10-CM

## 2023-04-04 PROCEDURE — 3074F SYST BP LT 130 MM HG: CPT | Performed by: FAMILY MEDICINE

## 2023-04-04 PROCEDURE — 99214 OFFICE O/P EST MOD 30 MIN: CPT | Performed by: FAMILY MEDICINE

## 2023-04-04 PROCEDURE — 3078F DIAST BP <80 MM HG: CPT | Performed by: FAMILY MEDICINE

## 2023-04-04 PROCEDURE — 1123F ACP DISCUSS/DSCN MKR DOCD: CPT | Performed by: FAMILY MEDICINE

## 2023-04-04 SDOH — ECONOMIC STABILITY: HOUSING INSECURITY
IN THE LAST 12 MONTHS, WAS THERE A TIME WHEN YOU DID NOT HAVE A STEADY PLACE TO SLEEP OR SLEPT IN A SHELTER (INCLUDING NOW)?: NO

## 2023-04-04 SDOH — ECONOMIC STABILITY: FOOD INSECURITY: WITHIN THE PAST 12 MONTHS, THE FOOD YOU BOUGHT JUST DIDN'T LAST AND YOU DIDN'T HAVE MONEY TO GET MORE.: NEVER TRUE

## 2023-04-04 SDOH — ECONOMIC STABILITY: FOOD INSECURITY: WITHIN THE PAST 12 MONTHS, YOU WORRIED THAT YOUR FOOD WOULD RUN OUT BEFORE YOU GOT MONEY TO BUY MORE.: NEVER TRUE

## 2023-04-04 SDOH — ECONOMIC STABILITY: INCOME INSECURITY: HOW HARD IS IT FOR YOU TO PAY FOR THE VERY BASICS LIKE FOOD, HOUSING, MEDICAL CARE, AND HEATING?: NOT HARD AT ALL

## 2023-04-04 ASSESSMENT — PATIENT HEALTH QUESTIONNAIRE - PHQ9
1. LITTLE INTEREST OR PLEASURE IN DOING THINGS: 0
SUM OF ALL RESPONSES TO PHQ QUESTIONS 1-9: 0
SUM OF ALL RESPONSES TO PHQ9 QUESTIONS 1 & 2: 0
SUM OF ALL RESPONSES TO PHQ QUESTIONS 1-9: 0
2. FEELING DOWN, DEPRESSED OR HOPELESS: 0
SUM OF ALL RESPONSES TO PHQ QUESTIONS 1-9: 0
SUM OF ALL RESPONSES TO PHQ QUESTIONS 1-9: 0

## 2023-04-04 NOTE — PATIENT INSTRUCTIONS
Ok to use heat to the chest  Ok to use tylenol for aches and pains   Consider shingle vaccine   Do use the current medicine  See us for a check in about 6 week

## 2023-04-04 NOTE — PROGRESS NOTES
Subjective:      Patient ID: Samia Wiley is a 69 y.o. female. Chief Complaint   Patient presents with    Follow-Up from [facility] 3- \"syncope and collapse\"        Patient presents with: Follow-Up from Hospital: [facility] 3- \"syncope and collapse\"    Here for the above  She is here with her son    Here with son ed    She has no c/o  She notes some chest pain still from the cpr  She did not arrest    She has been breathing is  Will hurt to breath in after the hospital stay  Sleep ok   No no v no d with immodium  No fever no ha   No so    YOB: 1945    Date of Visit:  4/4/2023     -- Barium Iodide -- Shortness Of Breath   -- Penicillins -- Rash    Current Outpatient Medications:  loperamide (IMODIUM) 2 MG capsule, Take 1 capsule by mouth 4 times daily as needed for Diarrhea, Disp: , Rfl:   diphenhydrAMINE (BENADRYL) 25 MG tablet, Take 1 tablet by mouth every 6 hours as needed for Itching, Disp: , Rfl:   levothyroxine (SYNTHROID) 88 MCG tablet, Take 1 tablet by mouth Daily, Disp: 30 tablet, Rfl: 1  b complex vitamins capsule, Take 1 capsule by mouth daily, Disp: , Rfl:   vitamin D (CHOLECALCIFEROL) 1000 UNIT TABS tablet, Take 1 tablet by mouth daily, Disp: , Rfl:   Multiple Vitamins-Minerals (THERAPEUTIC MULTIVITAMIN-MINERALS) tablet, Take 1 tablet by mouth daily, Disp: , Rfl:   vitamin C (ASCORBIC ACID) 500 MG tablet, Take 1 tablet by mouth daily, Disp: , Rfl:     No current facility-administered medications for this visit.      --------------------------               04/04/23                     1133        --------------------------   BP:          136/88        Site:    Left Upper Arm    Position:    Sitting        Cuff Size:  Medium Adult     Temp:   98 °F (36.7 °C)    TempSrc:    Temporal       Weight: 154 lb (69.9 kg)   Height: 5' 2\" (1.575 m)   --------------------------  Body mass index is 28.17 kg/m².      Wt Readings from

## 2023-04-27 RX ORDER — LEVOTHYROXINE SODIUM 88 UG/1
88 TABLET ORAL DAILY
Qty: 30 TABLET | Refills: 3 | Status: SHIPPED | OUTPATIENT
Start: 2023-04-27

## 2023-05-02 ENCOUNTER — TELEPHONE (OUTPATIENT)
Dept: FAMILY MEDICINE CLINIC | Age: 78
End: 2023-05-02

## 2023-05-02 NOTE — TELEPHONE ENCOUNTER
Patient was in the hospital and was given cpr and her ribs are still hurting. She was in [facility] over a month ago she said.     She is concerned that her ribs are bruised or broken

## 2023-05-23 ENCOUNTER — OFFICE VISIT (OUTPATIENT)
Dept: FAMILY MEDICINE CLINIC | Age: 78
End: 2023-05-23

## 2023-05-23 VITALS
DIASTOLIC BLOOD PRESSURE: 76 MMHG | HEIGHT: 62 IN | BODY MASS INDEX: 27.68 KG/M2 | SYSTOLIC BLOOD PRESSURE: 134 MMHG | TEMPERATURE: 97.8 F | WEIGHT: 150.4 LBS

## 2023-05-23 DIAGNOSIS — N28.9 RENAL INSUFFICIENCY: ICD-10-CM

## 2023-05-23 DIAGNOSIS — E03.9 HYPOTHYROIDISM (ACQUIRED): Primary | ICD-10-CM

## 2023-05-23 PROCEDURE — 3078F DIAST BP <80 MM HG: CPT | Performed by: FAMILY MEDICINE

## 2023-05-23 PROCEDURE — 99213 OFFICE O/P EST LOW 20 MIN: CPT | Performed by: FAMILY MEDICINE

## 2023-05-23 PROCEDURE — 1123F ACP DISCUSS/DSCN MKR DOCD: CPT | Performed by: FAMILY MEDICINE

## 2023-05-23 PROCEDURE — 3075F SYST BP GE 130 - 139MM HG: CPT | Performed by: FAMILY MEDICINE

## 2023-05-23 ASSESSMENT — ENCOUNTER SYMPTOMS
SHORTNESS OF BREATH: 0
VOMITING: 0
ABDOMINAL PAIN: 0
BLOOD IN STOOL: 0
CONSTIPATION: 0
DIARRHEA: 0
RECTAL PAIN: 0

## 2023-05-23 NOTE — PROGRESS NOTES
Subjective:      Patient ID: Carolyne Reyes is a 69 y.o. female. Chief Complaint   Patient presents with    Check-Up     Hypothyroidism- patient request 90 day Rx refill: Synthroid        Patient presents with:  Check-Up: Hypothyroidism- patient request 90 day Rx refill: Synthroid    Here for a check  She notes no pb  Meds same    With jose miguel dallas here   No mental or memory concerns  She is functioning well   He helps her with some paper work but usually minimal     YOB: 1945    Date of Visit:  5/23/2023     -- Barium Iodide -- Shortness Of Breath   -- Penicillins -- Rash    Current Outpatient Medications:  levothyroxine (SYNTHROID) 88 MCG tablet, Take 1 tablet by mouth Daily, Disp: 30 tablet, Rfl: 3  loperamide (IMODIUM) 2 MG capsule, Take 1 capsule by mouth 4 times daily as needed for Diarrhea, Disp: , Rfl:   diphenhydrAMINE (BENADRYL) 25 MG tablet, Take 1 tablet by mouth every 6 hours as needed for Itching, Disp: , Rfl:   b complex vitamins capsule, Take 1 capsule by mouth daily, Disp: , Rfl:   vitamin D (CHOLECALCIFEROL) 1000 UNIT TABS tablet, Take 1 tablet by mouth daily, Disp: , Rfl:   Multiple Vitamins-Minerals (THERAPEUTIC MULTIVITAMIN-MINERALS) tablet, Take 1 tablet by mouth daily, Disp: , Rfl:   vitamin C (ASCORBIC ACID) 500 MG tablet, Take 1 tablet by mouth daily, Disp: , Rfl:     No current facility-administered medications for this visit.      ---------------------------------                  05/23/23                            1403            ---------------------------------   BP:             134/76            Site:       Left Upper Arm       80 reg   Position:        Sitting           Cuff Size:     Medium Adult         Temp:      97.8 °F (36.6 °C)      TempSrc:       Temporal           Weight: 150 lb 6.4 oz (68.2 kg)   Height:     5' 2\" (1.575 m)      ---------------------------------  Body mass index is 27.51 kg/m².      Wt Readings from Last

## 2023-06-26 DIAGNOSIS — E03.9 HYPOTHYROIDISM (ACQUIRED): ICD-10-CM

## 2023-06-26 DIAGNOSIS — N28.9 RENAL INSUFFICIENCY: ICD-10-CM

## 2023-06-26 LAB
ANION GAP SERPL CALCULATED.3IONS-SCNC: 10 MMOL/L (ref 3–16)
BUN SERPL-MCNC: 19 MG/DL (ref 7–20)
CALCIUM SERPL-MCNC: 9.6 MG/DL (ref 8.3–10.6)
CHLORIDE SERPL-SCNC: 109 MMOL/L (ref 99–110)
CO2 SERPL-SCNC: 23 MMOL/L (ref 21–32)
CREAT SERPL-MCNC: 1.5 MG/DL (ref 0.6–1.2)
GFR SERPLBLD CREATININE-BSD FMLA CKD-EPI: 36 ML/MIN/{1.73_M2}
GLUCOSE SERPL-MCNC: 93 MG/DL (ref 70–99)
POTASSIUM SERPL-SCNC: 4.1 MMOL/L (ref 3.5–5.1)
SODIUM SERPL-SCNC: 142 MMOL/L (ref 136–145)
T4 FREE SERPL-MCNC: 1.7 NG/DL (ref 0.9–1.8)
TSH SERPL DL<=0.005 MIU/L-ACNC: 0.59 UIU/ML (ref 0.27–4.2)

## 2023-08-27 ENCOUNTER — TELEPHONE (OUTPATIENT)
Dept: FAMILY MEDICINE CLINIC | Age: 78
End: 2023-08-27

## 2023-08-27 RX ORDER — CIPROFLOXACIN 250 MG/1
250 TABLET, FILM COATED ORAL 2 TIMES DAILY
Qty: 20 TABLET | Refills: 0 | Status: SHIPPED | OUTPATIENT
Start: 2023-08-27 | End: 2023-08-28 | Stop reason: SDUPTHER

## 2023-08-28 ENCOUNTER — TELEPHONE (OUTPATIENT)
Dept: FAMILY MEDICINE CLINIC | Age: 78
End: 2023-08-28

## 2023-08-28 RX ORDER — CIPROFLOXACIN 250 MG/1
250 TABLET, FILM COATED ORAL 2 TIMES DAILY
Qty: 20 TABLET | Refills: 0 | Status: SHIPPED | OUTPATIENT
Start: 2023-08-28 | End: 2023-09-07

## 2023-08-28 NOTE — TELEPHONE ENCOUNTER
Patient has been calling all weekend to get script to [facility] in [place], but script keeps getting sent to [facility] in [place]. Can you please send to [facility],    Please let patient know.

## 2023-08-28 NOTE — TELEPHONE ENCOUNTER
Pt did not listen to her messages and was informed. Pt states she cannot leave a urine and DR Mari Rodriguez is aware. He just sends her medicine in. Informed to follow up with Dr Mari Rodriguez if she is not better.

## 2023-08-28 NOTE — TELEPHONE ENCOUNTER
Not sure if pt listened to the multiple VM from yesterday.  She needs to leave urine specimen and follow up with Dr. Nir Singh

## 2023-08-28 NOTE — TELEPHONE ENCOUNTER
Jessica Reddy advised and verbalized understanding. She states she does have urinary frequency and she was able to start Cipro 250 mg today. She states she knows in the past she has gotten 500 mg and is asking if that will be sent? Or do you think 250 mg will be enough for her?

## 2023-08-28 NOTE — TELEPHONE ENCOUNTER
Patient called back, she was advise that antibiotics sere sent to [facility] in [address], by [facility]. Patient states that when she has a uti you send her in a couple of medications, she does not remember what they were. She wants you to send to [facility] in [place], [place].

## 2023-09-22 RX ORDER — LEVOTHYROXINE SODIUM 88 UG/1
88 TABLET ORAL DAILY
Qty: 30 TABLET | Refills: 3 | Status: SHIPPED | OUTPATIENT
Start: 2023-09-22

## 2023-10-23 ENCOUNTER — TELEPHONE (OUTPATIENT)
Dept: FAMILY MEDICINE CLINIC | Age: 78
End: 2023-10-23

## 2023-10-23 NOTE — TELEPHONE ENCOUNTER
Destinee Gustafson and Ifeanyi (caregiver) called to let Dr. Gutierrez Trevino know that Destinee Gustafson has only been taking her Levothyroxine 88 mcg every other day for the past week. She complains when she took the 88 mcg every day she would feel wobbly. Since she has started every other day she states she feels fine. She does have upcoming appt in November but wanted Dr. Gutierrez Trevino to be aware of how she was taking her thyroid pill. Please advise.

## 2023-11-16 ENCOUNTER — TELEPHONE (OUTPATIENT)
Dept: FAMILY MEDICINE CLINIC | Age: 78
End: 2023-11-16

## 2023-11-16 RX ORDER — LEVOTHYROXINE SODIUM 88 UG/1
88 TABLET ORAL DAILY
Qty: 30 TABLET | Refills: 3 | Status: SHIPPED | OUTPATIENT
Start: 2023-11-16

## 2023-11-16 NOTE — TELEPHONE ENCOUNTER
Patient is asking to get Handicap  placard, she lives in [place]. Jeffrey De Dios can print off the website and mail to patient.         Please call, [phone]

## 2023-11-17 NOTE — TELEPHONE ENCOUNTER
Patient called and said she needs a handicap placard because her right knee gives out and is painful when she tries to walk much.

## 2023-11-27 ENCOUNTER — OFFICE VISIT (OUTPATIENT)
Dept: FAMILY MEDICINE CLINIC | Age: 78
End: 2023-11-27

## 2023-11-27 VITALS
DIASTOLIC BLOOD PRESSURE: 80 MMHG | BODY MASS INDEX: 28.3 KG/M2 | SYSTOLIC BLOOD PRESSURE: 134 MMHG | WEIGHT: 153.8 LBS | HEIGHT: 62 IN

## 2023-11-27 DIAGNOSIS — E03.9 ACQUIRED HYPOTHYROIDISM: Primary | ICD-10-CM

## 2023-11-27 PROCEDURE — 1123F ACP DISCUSS/DSCN MKR DOCD: CPT | Performed by: FAMILY MEDICINE

## 2023-11-27 PROCEDURE — 99212 OFFICE O/P EST SF 10 MIN: CPT | Performed by: FAMILY MEDICINE

## 2023-11-27 PROCEDURE — 3079F DIAST BP 80-89 MM HG: CPT | Performed by: FAMILY MEDICINE

## 2023-11-27 PROCEDURE — 3075F SYST BP GE 130 - 139MM HG: CPT | Performed by: FAMILY MEDICINE

## 2023-11-27 RX ORDER — LEVOTHYROXINE SODIUM 88 UG/1
44 TABLET ORAL DAILY
Qty: 30 TABLET | Refills: 3
Start: 2023-11-27

## 2023-11-29 ENCOUNTER — TELEPHONE (OUTPATIENT)
Dept: FAMILY MEDICINE CLINIC | Age: 78
End: 2023-11-29

## 2023-11-29 NOTE — TELEPHONE ENCOUNTER
Pt wants to know what Dr BURNS thinks that she should weigh? Pt would like a detailed message to be left on her machine.

## 2023-11-30 NOTE — TELEPHONE ENCOUNTER
[phone] ([place]) - Left [facility] for Maura to return call.

## 2023-12-01 NOTE — TELEPHONE ENCOUNTER
[phone] ([place]) - Left [facility] [place] Maura to return call.

## 2024-02-19 RX ORDER — CIPROFLOXACIN 250 MG/1
250 TABLET, FILM COATED ORAL 2 TIMES DAILY
Qty: 20 TABLET | Refills: 0 | OUTPATIENT
Start: 2024-02-19 | End: 2024-02-29

## 2024-04-05 RX ORDER — LEVOTHYROXINE SODIUM 88 UG/1
44 TABLET ORAL DAILY
Qty: 30 TABLET | Refills: 3 | Status: SHIPPED | OUTPATIENT
Start: 2024-04-05

## 2024-04-05 NOTE — TELEPHONE ENCOUNTER
----- Message from Msity Sandoval sent at 4/5/2024  8:45 AM EDT -----  Subject: Refill Request    QUESTIONS  Name of Medication? levothyroxine (SYNTHROID) 88 MCG tablet  Patient-reported dosage and instructions? 88 mcg , takes one pill a day in   morning  How many days do you have left? 10  Preferred Pharmacy? [facility] [id number]  Pharmacy phone number (if available)? [phone]  Additional Information for Provider? Patient notes that this must go to   [facility] in [place], [place].  ---------------------------------------------------------------------------  --------------  CALL BACK INFO  What is the best way for the office to contact you? OK to leave message on   voicemail  Preferred Call Back Phone Number? [phone]  ---------------------------------------------------------------------------  --------------  SCRIPT ANSWERS  Relationship to Patient? Self

## 2024-04-11 RX ORDER — LEVOTHYROXINE SODIUM 88 UG/1
88 TABLET ORAL DAILY
Qty: 30 TABLET | Refills: 3 | Status: SHIPPED | OUTPATIENT
Start: 2024-04-11

## 2024-05-06 ENCOUNTER — TELEPHONE (OUTPATIENT)
Dept: FAMILY MEDICINE CLINIC | Age: 79
End: 2024-05-06

## 2024-05-06 NOTE — TELEPHONE ENCOUNTER
TERRELLI:    Beatriz alexander/ [facility] in [place] wants to make Dr BURNS aware that the pt seemed \"more off\" than usual. She came to the [place] office thinking that today was Wednesday and that she had an appointment somewhere but she had no appointment. Beatriz is concerned about the pt driving. Beatriz just wanted for Dr BURNS to be made aware in case he wants to mention it at her next visit.

## 2024-06-01 NOTE — TELEPHONE ENCOUNTER
Patient has symptoms of UTI, lots of pressure and pain and up every hour hour to urinate. She had this just 2 months ago. She has an inverted uterus.     Please call, Ambulatory